# Patient Record
Sex: FEMALE | Race: WHITE | NOT HISPANIC OR LATINO | Employment: STUDENT | URBAN - METROPOLITAN AREA
[De-identification: names, ages, dates, MRNs, and addresses within clinical notes are randomized per-mention and may not be internally consistent; named-entity substitution may affect disease eponyms.]

---
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
## 2021-04-12 DIAGNOSIS — Z23 ENCOUNTER FOR IMMUNIZATION: ICD-10-CM

## 2021-05-19 ENCOUNTER — IMMUNIZATIONS (OUTPATIENT)
Dept: FAMILY MEDICINE CLINIC | Facility: HOSPITAL | Age: 21
End: 2021-05-19

## 2021-05-19 DIAGNOSIS — Z23 ENCOUNTER FOR IMMUNIZATION: Primary | ICD-10-CM

## 2021-05-19 PROCEDURE — 91300 SARS-COV-2 / COVID-19 MRNA VACCINE (PFIZER-BIONTECH) 30 MCG: CPT

## 2021-05-19 PROCEDURE — 0001A SARS-COV-2 / COVID-19 MRNA VACCINE (PFIZER-BIONTECH) 30 MCG: CPT

## 2021-06-09 ENCOUNTER — IMMUNIZATIONS (OUTPATIENT)
Dept: FAMILY MEDICINE CLINIC | Facility: HOSPITAL | Age: 21
End: 2021-06-09

## 2021-06-09 DIAGNOSIS — Z23 ENCOUNTER FOR IMMUNIZATION: Primary | ICD-10-CM

## 2021-06-09 PROCEDURE — 91300 SARS-COV-2 / COVID-19 MRNA VACCINE (PFIZER-BIONTECH) 30 MCG: CPT

## 2021-06-09 PROCEDURE — 0002A SARS-COV-2 / COVID-19 MRNA VACCINE (PFIZER-BIONTECH) 30 MCG: CPT

## 2022-06-30 NOTE — PROGRESS NOTES
Assessment/Plan:    Crohn's disease of small intestine with intestinal obstruction [facility])    40-year-old girl with terminal ileal Crohn's  She has had persistent symptoms even on steroids  She is not obstructed at this time  I have strongly recommended for the family to consider starting her on biologic or immunomodulator  We had a lengthy discussion with her father in regards to the various biologics and immunomodulators and what the side effects are  We discussed all including Remicade, Humira, Entyvio and Stelara as well as Imuran/6 MP  I would recommend starting her on a biologic at this time and have recommended either Remicade or Humira  We also discussed step-up vs step-down approach  At this time we will do only biologics  The patient's father would like to get an opinion from [facility] and I have encouraged this  Have an appointment next Monday  I did request him that he should make a decision soon since the patient is still having symptoms  He is in agreement  Another possibility would be to start her on methotrexate as well  I would also like to check stool studies  Repeat inflammatory markers  Check calprotectin  Diagnoses and all orders for this visit:    Crohn's disease of small intestine with intestinal obstruction (HCC)  -     Fecal leukocytes; Future  -     Ova and parasite examination; Future  -     Sedimentation rate, automated; Future  -     C-reactive protein; Future  -     Calprotectin,Fecal; Future  -     Clostridium difficile toxin by PCR; Future          Subjective:      Patient ID: Aleksander Baird is a 16 y o  female  HPI     40-year-old girl who presents to us for evaluation of Crohn's disease  She was admitted to the hospital in the past month with Crohn's flare  She has had disease for the past 2-3 years  This time imaging had shown significant inflammation in terminal ileum with possible obstruction  She does started on IV steroids  Lokesh Jeter is a 62 y.o.  female and presents with    Chief Complaint   Patient presents with    URI   Lokesh Jeter, who was evaluated through a synchronous (real-time) audio-video encounter, and/or her healthcare decision maker, is aware that it is a billable service, which includes applicable co-pays, with coverage as determined by her insurance carrier. She provided verbal consent to proceed and patient identification was verified. This visit was conducted pursuant to the emergency declaration under the [facility], [address] [place] [address] and the [employer] Act. A caregiver was present when appropriate. Ability to conduct physical exam was limited. The patient was located at: Home: [address] [place] [address]  The provider was located at: Facility (Appt Department): [address] [place] [address] 4  [address]  [phone]    --Pavel Soto MD on 6/30/2022 at 4:37 PM    Subjective:  Upper Respiratory Infection  Patient complains of symptoms of a URI. Symptoms include congestion, coryza, sore throat and cough. Onset of symptoms was 5 days ago, gradually improving since that time. She also c/o achiness for the past 5 days . She is drinking moderate amounts of fluids. . Evaluation to date: negative covid test Treatment to date: decongestants, mucinex. She has history of pneumonia with most recent episode last month. ROS     All other systems reviewed and are negative. Objective: There were no vitals filed for this visit. alert, well appearing, and in no distress, oriented to person, place, and time and normal appearing weight  Mental status - normal mood, behavior, speech, dress, motor activity, and thought processes  Chest - intermittent cough  Neurological - cranial nerves II through XII intact    LABS     TESTS      Assessment/Plan:    1.  Bronchitis  abx and cough She did well and was started to tolerate diet as well  She was discharged home and was on 40 mg of oral prednisone  She was tapered down to 10 mg but then had another flare at which point her steroids were increased to 20 mg  She then got admitted to the ER again with abdominal pain and the steroids will further increase to 30 mg  At this time she is having some abdominal discomfort  She is able to tolerate food  She does have regular bowel movements without any blood or mucus  She has no nausea vomiting  No fevers or chills  She is not losing weight  She does have swelling of her face due to the steroids  She also had a colonoscopy done in the past 2 weeks which showed normal colon and the terminal ileum was not possible to be intubated  No significant inflammation and biopsies showed findings suggestive of Crohn's disease  The following portions of the patient's history were reviewed and updated as appropriate: allergies, current medications, past family history, past medical history, past social history, past surgical history and problem list     Review of Systems   Constitutional: Negative  HENT: Negative  Eyes: Negative  Respiratory: Negative  Cardiovascular: Negative  Gastrointestinal:        See HPI   Endocrine: Negative  Genitourinary: Negative  Musculoskeletal: Negative  Skin: Negative  Allergic/Immunologic: Negative  Neurological: Negative  Hematological: Negative  Psychiatric/Behavioral: Negative  All other systems reviewed and are negative  Objective:      BP (!) 121/78 (BP Location: Left arm, Patient Position: Sitting, Cuff Size: Standard)   Pulse 84   Temp (!) 97 1 °F (36 2 °C) (Tympanic)   Ht 5' 3" (1 6 m)   Wt 67 kg (147 lb 12 8 oz)   LMP 03/21/2018   BMI 26 18 kg/m²          Physical Exam   Constitutional: She is oriented to person, place, and time  Vital signs are normal  She appears well-developed and well-nourished     HENT:   Head: suppressant ordered  - azithromycin (ZITHROMAX) 250 mg tablet; 2 today, then 1 daily till gone. Dispense: 6 Tablet; Refill: 0  - benzonatate (TESSALON) 200 mg capsule; Take 1 Capsule by mouth three (3) times daily as needed for Cough for up to 7 days. Dispense: 21 Capsule; Refill: 0      Lab review: no lab studies available for review at time of visit      I have discussed the diagnosis with the patient and the intended plan as seen in the above orders. I have discussed medication side effects and warnings with the patient as well. I have reviewed the plan of care with the patient, accepted their input and they are in agreement with the treatment goals. Normocephalic and atraumatic  Eyes: Conjunctivae are normal  Pupils are equal, round, and reactive to light  No scleral icterus  Neck: Normal range of motion  Cardiovascular: Normal rate, regular rhythm and normal heart sounds  Pulmonary/Chest: Effort normal and breath sounds normal  No respiratory distress  Abdominal: Soft  Normal appearance and bowel sounds are normal  She exhibits no distension, no ascites and no mass  There is no hepatosplenomegaly  There is tenderness (Diffusely but more so in the right lower quadrant)  No hernia  Musculoskeletal: Normal range of motion  Lymphadenopathy:     She has no cervical adenopathy  Neurological: She is alert and oriented to person, place, and time  Skin: Skin is warm  Psychiatric: She has a normal mood and affect   Her behavior is normal  Thought content normal

## 2022-08-30 ENCOUNTER — APPOINTMENT (OUTPATIENT)
Dept: LAB | Facility: CLINIC | Age: 22
End: 2022-08-30

## 2022-08-30 DIAGNOSIS — Z02.1 PRE-EMPLOYMENT EXAMINATION: ICD-10-CM

## 2022-08-30 PROCEDURE — 36415 COLL VENOUS BLD VENIPUNCTURE: CPT

## 2022-08-30 PROCEDURE — 86765 RUBEOLA ANTIBODY: CPT

## 2022-08-30 PROCEDURE — 86762 RUBELLA ANTIBODY: CPT

## 2022-08-30 PROCEDURE — 86735 MUMPS ANTIBODY: CPT

## 2022-08-30 PROCEDURE — 86480 TB TEST CELL IMMUN MEASURE: CPT

## 2022-08-30 PROCEDURE — 86787 VARICELLA-ZOSTER ANTIBODY: CPT

## 2022-08-31 LAB
MEV IGG SER QL IA: NORMAL
MUV IGG SER QL IA: NORMAL
RUBV IGG SERPL IA-ACNC: 99.6 IU/ML
VZV IGG SER QL IA: NORMAL

## 2022-09-01 LAB
GAMMA INTERFERON BACKGROUND BLD IA-ACNC: 0.04 IU/ML
M TB IFN-G BLD-IMP: NEGATIVE
M TB IFN-G CD4+ BCKGRND COR BLD-ACNC: -0.01 IU/ML
M TB IFN-G CD4+ BCKGRND COR BLD-ACNC: 0 IU/ML
MITOGEN IGNF BCKGRD COR BLD-ACNC: >10 IU/ML

## 2022-11-03 ENCOUNTER — APPOINTMENT (EMERGENCY)
Dept: RADIOLOGY | Facility: HOSPITAL | Age: 22
End: 2022-11-03

## 2022-11-03 ENCOUNTER — HOSPITAL ENCOUNTER (EMERGENCY)
Facility: HOSPITAL | Age: 22
Discharge: HOME | End: 2022-11-04
Attending: EMERGENCY MEDICINE

## 2022-11-03 VITALS
DIASTOLIC BLOOD PRESSURE: 62 MMHG | RESPIRATION RATE: 20 BRPM | OXYGEN SATURATION: 100 % | SYSTOLIC BLOOD PRESSURE: 113 MMHG | TEMPERATURE: 98.1 F | HEART RATE: 73 BPM

## 2022-11-03 DIAGNOSIS — M54.50 ACUTE LOW BACK PAIN: Primary | ICD-10-CM

## 2022-11-03 DIAGNOSIS — J40 BRONCHITIS: ICD-10-CM

## 2022-11-03 LAB
ANION GAP SERPL CALCULATED.3IONS-SCNC: 9 MMOL/L (ref 4–13)
BASOPHILS # BLD AUTO: 0.03 THOUSANDS/ÂΜL (ref 0–0.1)
BASOPHILS NFR BLD AUTO: 0 % (ref 0–1)
BILIRUB UR QL STRIP: NEGATIVE
BUN SERPL-MCNC: 10 MG/DL (ref 5–25)
CALCIUM SERPL-MCNC: 9.1 MG/DL (ref 8.4–10.2)
CHLORIDE SERPL-SCNC: 105 MMOL/L (ref 96–108)
CLARITY UR: CLEAR
CO2 SERPL-SCNC: 21 MMOL/L (ref 21–32)
COLOR UR: YELLOW
CREAT SERPL-MCNC: 0.51 MG/DL (ref 0.6–1.3)
EOSINOPHIL # BLD AUTO: 0.23 THOUSAND/ÂΜL (ref 0–0.61)
EOSINOPHIL NFR BLD AUTO: 2 % (ref 0–6)
ERYTHROCYTE [DISTWIDTH] IN BLOOD BY AUTOMATED COUNT: 12.2 % (ref 11.6–15.1)
EXT PREG TEST URINE: NEGATIVE
EXT. CONTROL ED NAV: NORMAL
FLUAV RNA RESP QL NAA+PROBE: NEGATIVE
FLUBV RNA RESP QL NAA+PROBE: NEGATIVE
GFR SERPL CREATININE-BSD FRML MDRD: 136 ML/MIN/1.73SQ M
GLUCOSE SERPL-MCNC: 102 MG/DL (ref 65–140)
GLUCOSE UR STRIP-MCNC: NEGATIVE MG/DL
HCT VFR BLD AUTO: 37.3 % (ref 34.8–46.1)
HGB BLD-MCNC: 12.8 G/DL (ref 11.5–15.4)
HGB UR QL STRIP.AUTO: NEGATIVE
IMM GRANULOCYTES # BLD AUTO: 0.02 THOUSAND/UL (ref 0–0.2)
IMM GRANULOCYTES NFR BLD AUTO: 0 % (ref 0–2)
KETONES UR STRIP-MCNC: NEGATIVE MG/DL
LEUKOCYTE ESTERASE UR QL STRIP: NEGATIVE
LYMPHOCYTES # BLD AUTO: 4.32 THOUSANDS/ÂΜL (ref 0.6–4.47)
LYMPHOCYTES NFR BLD AUTO: 34 % (ref 14–44)
MCH RBC QN AUTO: 27.1 PG (ref 26.8–34.3)
MCHC RBC AUTO-ENTMCNC: 34.3 G/DL (ref 31.4–37.4)
MCV RBC AUTO: 79 FL (ref 82–98)
MONOCYTES # BLD AUTO: 0.68 THOUSAND/ÂΜL (ref 0.17–1.22)
MONOCYTES NFR BLD AUTO: 5 % (ref 4–12)
NEUTROPHILS # BLD AUTO: 7.31 THOUSANDS/ÂΜL (ref 1.85–7.62)
NEUTS SEG NFR BLD AUTO: 59 % (ref 43–75)
NITRITE UR QL STRIP: NEGATIVE
NRBC BLD AUTO-RTO: 0 /100 WBCS
PH UR STRIP.AUTO: 6 [PH]
PLATELET # BLD AUTO: 277 THOUSANDS/UL (ref 149–390)
PMV BLD AUTO: 9.7 FL (ref 8.9–12.7)
POTASSIUM SERPL-SCNC: 3.6 MMOL/L (ref 3.5–5.3)
PROT UR STRIP-MCNC: NEGATIVE MG/DL
RBC # BLD AUTO: 4.73 MILLION/UL (ref 3.81–5.12)
RSV RNA RESP QL NAA+PROBE: NEGATIVE
SARS-COV-2 RNA RESP QL NAA+PROBE: NEGATIVE
SODIUM SERPL-SCNC: 135 MMOL/L (ref 135–147)
SP GR UR STRIP.AUTO: >=1.03 (ref 1–1.03)
UROBILINOGEN UR QL STRIP.AUTO: 1 E.U./DL
WBC # BLD AUTO: 12.59 THOUSAND/UL (ref 4.31–10.16)

## 2022-11-03 RX ORDER — KETOROLAC TROMETHAMINE 30 MG/ML
15 INJECTION, SOLUTION INTRAMUSCULAR; INTRAVENOUS ONCE
Status: COMPLETED | OUTPATIENT
Start: 2022-11-03 | End: 2022-11-03

## 2022-11-03 RX ORDER — CYCLOBENZAPRINE HCL 10 MG
10 TABLET ORAL 2 TIMES DAILY PRN
Qty: 20 TABLET | Refills: 0 | Status: SHIPPED | OUTPATIENT
Start: 2022-11-03

## 2022-11-03 RX ORDER — PREDNISONE 20 MG/1
40 TABLET ORAL ONCE
Status: COMPLETED | OUTPATIENT
Start: 2022-11-03 | End: 2022-11-03

## 2022-11-03 RX ORDER — NAPROXEN 500 MG/1
500 TABLET ORAL 2 TIMES DAILY WITH MEALS
Qty: 30 TABLET | Refills: 0 | Status: SHIPPED | OUTPATIENT
Start: 2022-11-03

## 2022-11-03 RX ORDER — LIDOCAINE 50 MG/G
1 PATCH TOPICAL ONCE
Status: DISCONTINUED | OUTPATIENT
Start: 2022-11-03 | End: 2022-11-04 | Stop reason: HOSPADM

## 2022-11-03 RX ORDER — PREDNISONE 20 MG/1
TABLET ORAL
Qty: 8 TABLET | Refills: 0 | Status: SHIPPED | OUTPATIENT
Start: 2022-11-03

## 2022-11-03 RX ORDER — ALBUTEROL SULFATE 90 UG/1
2 AEROSOL, METERED RESPIRATORY (INHALATION) ONCE
Status: COMPLETED | OUTPATIENT
Start: 2022-11-03 | End: 2022-11-03

## 2022-11-03 RX ORDER — INFLIXIMAB 100 MG/10ML
INJECTION, POWDER, LYOPHILIZED, FOR SOLUTION INTRAVENOUS
COMMUNITY

## 2022-11-03 RX ORDER — ONDANSETRON 2 MG/ML
4 INJECTION INTRAMUSCULAR; INTRAVENOUS ONCE
Status: DISCONTINUED | OUTPATIENT
Start: 2022-11-03 | End: 2022-11-03

## 2022-11-03 RX ADMIN — LIDOCAINE 5% 1 PATCH: 700 PATCH TOPICAL at 22:22

## 2022-11-03 RX ADMIN — PREDNISONE 40 MG: 20 TABLET ORAL at 22:20

## 2022-11-03 RX ADMIN — ALBUTEROL SULFATE 2 PUFF: 90 AEROSOL, METERED RESPIRATORY (INHALATION) at 22:23

## 2022-11-03 RX ADMIN — KETOROLAC TROMETHAMINE 15 MG: 30 INJECTION, SOLUTION INTRAMUSCULAR at 22:40

## 2022-11-04 ENCOUNTER — NURSE TRIAGE (OUTPATIENT)
Dept: PHYSICAL THERAPY | Facility: OTHER | Age: 22
End: 2022-11-04

## 2022-11-04 ENCOUNTER — TELEPHONE (OUTPATIENT)
Dept: PHYSICAL THERAPY | Facility: OTHER | Age: 22
End: 2022-11-04

## 2022-11-04 DIAGNOSIS — M54.42 ACUTE BILATERAL LOW BACK PAIN WITH BILATERAL SCIATICA: Primary | ICD-10-CM

## 2022-11-04 DIAGNOSIS — M54.41 ACUTE BILATERAL LOW BACK PAIN WITH BILATERAL SCIATICA: Primary | ICD-10-CM

## 2022-11-04 NOTE — ED PROVIDER NOTES
History  Chief Complaint   Patient presents with   • Back Pain     Back pain for the last 3 weeks, states she does contributes an injury to the back pain, just came on slowly and worse over time  State sshe has also had a cough for the same amount of time which has been productive at times  Took vicks severe flu pills and this tussin decongestive, taken for the past few days including this morning  79-year-old female for back pain and cough  Patient states she has had lower back pain for approximately 3 weeks  Patient denies inciting injury  Patient states pain travels down her legs  Patient denies any red flag symptoms  Patient also complains 2 weeks of cough  Patient denies any fever chills chest pain or shortness of breath  Patient states she just keeps coughing and cannot stop despite over-the-counter medications        History provided by:  Patient   used: No    Back Pain  Location:  Lumbar spine  Quality:  Cramping and shooting  Radiates to:  R posterior upper leg and L posterior upper leg  Pain severity:  Moderate  Pain is:  Same all the time  Onset quality:  Gradual  Timing:  Constant  Progression:  Worsening  Chronicity:  New  Context: not falling, not occupational injury and not recent injury    Ineffective treatments:  None tried  Associated symptoms: no abdominal pain, no bladder incontinence, no bowel incontinence, no chest pain, no fever, no headaches, no numbness, no paresthesias, no perianal numbness, no tingling and no weakness    Risk factors: no hx of cancer, no menopause and no recent surgery    Flu Symptoms  Presenting symptoms: cough    Presenting symptoms: no diarrhea, no fatigue, no fever, no headaches, no myalgias, no nausea, no shortness of breath and no sore throat    Severity:  Moderate  Onset quality:  Gradual  Progression:  Unchanged  Chronicity:  New  Ineffective treatments:  Decongestant and OTC medications  Associated symptoms: nasal congestion Associated symptoms: no chills, no decreased appetite, no ear pain and no neck stiffness    Risk factors: not elderly, no liver disease and not pregnant        Prior to Admission Medications   Prescriptions Last Dose Informant Patient Reported? Taking? inFLIXimab (REMICADE) 100 mg   Yes No   Sig: Inject into a catheter in a vein   levothyroxine (SYNTHROID) 150 mcg tablet  Self Yes No   Sig: Take 150 mcg by mouth daily        Facility-Administered Medications: None       Past Medical History:   Diagnosis Date   • Abdominal pain    • Abnormal CAT scan     inflammation on terminal ileum   • Hypothyroid        Past Surgical History:   Procedure Laterality Date   • GA COLONOSCOPY FLX DX W/COLLJ SPEC WHEN PFRMD N/A 3/29/2018    Procedure: COLONOSCOPY;  Surgeon: Juanito Robles MD;  Location: [facility]; Service: Gastroenterology       Family History   Problem Relation Age of Onset   • Crohn's disease Paternal Aunt      I have reviewed and agree with the history as documented  E-Cigarette/Vaping     E-Cigarette/Vaping Substances     Social History     Tobacco Use   • Smoking status: Never Smoker   • Smokeless tobacco: Never Used   Substance Use Topics   • Alcohol use: No   • Drug use: No       Review of Systems   Constitutional: Negative for chills, decreased appetite, fatigue and fever  HENT: Positive for congestion  Negative for ear pain and sore throat  Eyes: Negative for discharge and redness  Respiratory: Positive for cough  Negative for apnea, shortness of breath and wheezing  Cardiovascular: Negative for chest pain  Gastrointestinal: Negative for abdominal pain, bowel incontinence, diarrhea and nausea  Endocrine: Negative for cold intolerance and polydipsia  Genitourinary: Negative for bladder incontinence, difficulty urinating and hematuria  Musculoskeletal: Positive for back pain  Negative for arthralgias, myalgias and neck stiffness  Skin: Negative for color change and rash  Allergic/Immunologic: Negative for environmental allergies and immunocompromised state  Neurological: Negative for tingling, weakness, numbness, headaches and paresthesias  Hematological: Negative for adenopathy  Does not bruise/bleed easily  Psychiatric/Behavioral: Negative for agitation and behavioral problems  Physical Exam  Physical Exam  Vitals and nursing note reviewed  Constitutional:       Appearance: Normal appearance  She is well-developed  She is not toxic-appearing  HENT:      Head: Normocephalic and atraumatic  Right Ear: Tympanic membrane and external ear normal       Left Ear: Tympanic membrane and external ear normal       Nose: Nose normal  No nasal deformity or rhinorrhea  Mouth/Throat:      Dentition: Normal dentition  Pharynx: Uvula midline  Eyes:      General: Lids are normal          Right eye: No discharge  Left eye: No discharge  Conjunctiva/sclera: Conjunctivae normal       Pupils: Pupils are equal, round, and reactive to light  Neck:      Vascular: No carotid bruit or JVD  Trachea: Trachea normal    Cardiovascular:      Rate and Rhythm: Normal rate and regular rhythm  No extrasystoles are present  Chest Wall: PMI is not displaced  Pulses: Normal pulses  Pulmonary:      Effort: Pulmonary effort is normal  No accessory muscle usage or respiratory distress  Breath sounds: Normal breath sounds  No wheezing, rhonchi or rales  Abdominal:      General: Bowel sounds are normal       Palpations: Abdomen is soft  Abdomen is not rigid  There is no mass  Tenderness: There is no abdominal tenderness  There is no guarding or rebound  Musculoskeletal:      Right shoulder: No swelling, deformity or bony tenderness  Normal range of motion  Cervical back: Normal range of motion and neck supple  No deformity, tenderness or bony tenderness  Lumbar back: Spasms present  Decreased range of motion     Lymphadenopathy: Cervical: No cervical adenopathy  Skin:     General: Skin is warm and dry  Findings: No rash  Neurological:      Mental Status: She is alert and oriented to person, place, and time  GCS: GCS eye subscore is 4  GCS verbal subscore is 5  GCS motor subscore is 6  Cranial Nerves: No cranial nerve deficit  Sensory: No sensory deficit  Deep Tendon Reflexes: Reflexes are normal and symmetric     Psychiatric:         Speech: Speech normal          Behavior: Behavior normal          Vital Signs  ED Triage Vitals   Temperature Pulse Respirations Blood Pressure SpO2   11/03/22 2141 11/03/22 2141 11/03/22 2141 11/03/22 2141 11/03/22 2141   98 1 °F (36 7 °C) 73 20 113/62 100 %      Temp Source Heart Rate Source Patient Position - Orthostatic VS BP Location FiO2 (%)   11/03/22 2141 11/03/22 2141 11/03/22 2141 11/03/22 2141 --   Oral Monitor Lying Right arm       Pain Score       11/03/22 2240       4           Vitals:    11/03/22 2141   BP: 113/62   Pulse: 73   Patient Position - Orthostatic VS: Lying         Visual Acuity      ED Medications  Medications   lidocaine (LIDODERM) 5 % patch 1 patch (1 patch Topical Medication Applied 11/3/22 2222)   ketorolac (TORADOL) injection 15 mg (15 mg Intravenous Given 11/3/22 2240)   albuterol (PROVENTIL HFA,VENTOLIN HFA) inhaler 2 puff (2 puffs Inhalation Given 11/3/22 2223)   predniSONE tablet 40 mg (40 mg Oral Given 11/3/22 2220)       Diagnostic Studies  Results Reviewed     Procedure Component Value Units Date/Time    Basic metabolic panel [[id number]]  (Abnormal) Collected: 11/03/22 2244    Lab Status: Final result Specimen: Blood from Arm, Left Updated: 11/03/22 2309     Sodium 135 mmol/L      Potassium 3 6 mmol/L      Chloride 105 mmol/L      CO2 21 mmol/L      ANION GAP 9 mmol/L      BUN 10 mg/dL      Creatinine 0 51 mg/dL      Glucose 102 mg/dL      Calcium 9 1 mg/dL      eGFR 136 ml/min/1 73sq m     Narrative:      Jaimie guidelines for Chronic Kidney Disease (CKD):   •  Stage 1 with normal or high GFR (GFR > 90 mL/min/1 73 square meters)  •  Stage 2 Mild CKD (GFR = 60-89 mL/min/1 73 square meters)  •  Stage 3A Moderate CKD (GFR = 45-59 mL/min/1 73 square meters)  •  Stage 3B Moderate CKD (GFR = 30-44 mL/min/1 73 square meters)  •  Stage 4 Severe CKD (GFR = 15-29 mL/min/1 73 square meters)  •  Stage 5 End Stage CKD (GFR <15 mL/min/1 73 square meters)  Note: GFR calculation is accurate only with a steady state creatinine    UA w Reflex to Microscopic w Reflex to Culture [[id number]]  (Normal) Collected: 11/03/22 2243    Lab Status: Final result Specimen: Urine, Other Updated: 11/03/22 2251     Color, UA Yellow     Clarity, UA Clear     Specific Gravity, UA >=1 030     pH, UA 6 0     Leukocytes, UA Negative     Nitrite, UA Negative     Protein, UA Negative mg/dl      Glucose, UA Negative mg/dl      Ketones, UA Negative mg/dl      Urobilinogen, UA 1 0 E U /dl      Bilirubin, UA Negative     Occult Blood, UA Negative    CBC and differential [[id number]]  (Abnormal) Collected: 11/03/22 2244    Lab Status: Final result Specimen: Blood from Arm, Left Updated: 11/03/22 2250     WBC 12 59 Thousand/uL      RBC 4 73 Million/uL      Hemoglobin 12 8 g/dL      Hematocrit 37 3 %      MCV 79 fL      MCH 27 1 pg      MCHC 34 3 g/dL      RDW 12 2 %      MPV 9 7 fL      Platelets 528 Thousands/uL      nRBC 0 /100 WBCs      Neutrophils Relative 59 %      Immat GRANS % 0 %      Lymphocytes Relative 34 %      Monocytes Relative 5 %      Eosinophils Relative 2 %      Basophils Relative 0 %      Neutrophils Absolute 7 31 Thousands/µL      Immature Grans Absolute 0 02 Thousand/uL      Lymphocytes Absolute 4 32 Thousands/µL      Monocytes Absolute 0 68 Thousand/µL      Eosinophils Absolute 0 23 Thousand/µL      Basophils Absolute 0 03 Thousands/µL     FLU/RSV/COVID - if FLU/RSV clinically relevant [[id number]]  (Normal) Collected: 11/03/22 2202    Lab Status: Final result Specimen: Nares from Nose Updated: 11/03/22 2245     SARS-CoV-2 Negative     INFLUENZA A PCR Negative     INFLUENZA B PCR Negative     RSV PCR Negative    Narrative:      FOR PEDIATRIC PATIENTS - copy/paste COVID Guidelines URL to browser: [email or web address]    SARS-CoV-2 assay is a Nucleic Acid Amplification assay intended for the  qualitative detection of nucleic acid from SARS-CoV-2 in nasopharyngeal  swabs  Results are for the presumptive identification of SARS-CoV-2 RNA  Positive results are indicative of infection with SARS-CoV-2, the virus  causing COVID-19, but do not rule out bacterial infection or co-infection  with other viruses  Laboratories within the [place] and its  territories are required to report all positive results to the appropriate  public health authorities  Negative results do not preclude SARS-CoV-2  infection and should not be used as the sole basis for treatment or other  patient management decisions  Negative results must be combined with  clinical observations, patient history, and epidemiological information  This test has not been FDA cleared or approved  This test has been authorized by FDA under an Emergency Use Authorization  (EUA)  This test is only authorized for the duration of time the  declaration that circumstances exist justifying the authorization of the  emergency use of an in vitro diagnostic tests for detection of SARS-CoV-2  virus and/or diagnosis of COVID-19 infection under section 564(b)(1) of  the Act, 21 U  S C  197RIB-2(W)(7), unless the authorization is terminated  or revoked sooner  The test has been validated but independent review by FDA  and CLIA is pending  Test performed using [employer] GeneXpert: This RT-PCR assay targets N2,  a region unique to SARS-CoV-2  A conserved region in the E-gene was chosen  for pan-Sarbecovirus detection which includes SARS-CoV-2      According to CMS-2020-01-R, this platform meets the definition of high-throughput technology  POCT pregnancy, urine [[id number]]  (Normal) Resulted: 11/03/22 2155    Lab Status: Final result Updated: 11/03/22 2155     EXT PREG TEST UR (Ref: Negative) Negative     Control valid                 XR lumbar spine 2 or 3 views    (Results Pending)   XR chest 1 view    (Results Pending)              Procedures  Procedures         ED Course                                             MDM  Number of Diagnoses or Management Options  Acute low back pain: new and requires workup  Bronchitis: new and requires workup     Amount and/or Complexity of Data Reviewed  Clinical lab tests: reviewed and ordered  Tests in the radiology section of CPT®: ordered and reviewed  Tests in the medicine section of CPT®: reviewed and ordered  Independent visualization of images, tracings, or specimens: yes (No pneumonia or pneumothorax  No fracture dislocation of lumbar spine)    Patient Progress  Patient progress: stable      Disposition  Final diagnoses:   Acute low back pain   Bronchitis     Time reflects when diagnosis was documented in both MDM as applicable and the Disposition within this note     Time User Action Codes Description Comment    11/3/2022 11:41 PM Adelaide HAMM Add [M54 50] Acute low back pain     11/3/2022 11:41 PM Rockne Gitelman Add [J40] Bronchitis       ED Disposition     ED Disposition   Discharge    Condition   Stable    Date/Time   Thu Nov 3, 2022 11:40 PM    Comment   Alejandra Stewart discharge to home/self care                 Follow-up Information     Follow up With Specialties Details Why Contact Info    Khris Wilkerson MD Pediatrics Schedule an appointment as soon as possible for a visit   [address] [place] [address]   [address]  [facility] [address]  [phone]            Discharge Medication List as of 11/3/2022 11:50 PM      START taking these medications    Details   cyclobenzaprine (FLEXERIL) 10 mg tablet Take 1 tablet (10 mg total) by mouth 2 (two) times a day as needed for muscle spasms, Starting Thu 11/3/2022, Normal      naproxen (Naprosyn) 500 mg tablet Take 1 tablet (500 mg total) by mouth 2 (two) times a day with meals, Starting Thu 11/3/2022, Normal      predniSONE 20 mg tablet Take 2 tabs daily for 4 days, Print         CONTINUE these medications which have NOT CHANGED    Details   inFLIXimab (REMICADE) 100 mg Inject into a catheter in a vein, Historical Med      levothyroxine (SYNTHROID) 150 mcg tablet Take 150 mcg by mouth daily  , Starting Tue 1/16/2018, Historical Med                 PDMP Review     None          ED Provider  Electronically Signed by           Leslie Hernandez DO  11/04/22 0031

## 2022-11-04 NOTE — TELEPHONE ENCOUNTER
Nurse reached out to discuss recent referral entered for  [facility] program     Nurse spoke with father at preferred number listed in patients demographics  Patient is currently working and unavailable  Father asked if this was regarding her ED visit yesterday for her back  Nurse confirmed and informed him that his Cell number is listed as the ONLY and preferred contact in the patients demographics  Father will give message to his daughter and have her CB  Nurse confirmed  [facility] contact info, hours of operation, and VM instructions  Nurse informed him of protocol to defer the referral if the staff does not speak to the patient and offered to keep referral in pending status  Father agreed and asked nurse to please follow [facility] protocol for f/u call  Nurse agreed  Father very appreciative of call and discussion  Nurse also suggested patient add her phone number to demographics as she is an adult  Informed him chart may contain more than one number  Pts cell- [phone] per father

## 2022-11-04 NOTE — ED NOTES
Discharge reviewed with patient  Patient verbalized understanding and has no further questions at this time  Patient ambulatory off unit with steady gait        Didier Rodriguez, [address] [facility]  11/04/22 9832

## 2022-11-04 NOTE — TELEPHONE ENCOUNTER
Additional Information  • Negative: Is this related to a work injury? • Negative: Is this related to an MVA? • Negative: Are you currently recieving homecare services? Background - Initial Assessment  Clinical complaint: Pain is bilat low back, radiates down bilat legs to ankles  No numbness or tingling  Started 10/13/22  Was getting worse  NKI  Was seen in the ED 11/3/22  Date of onset: 10/13/22  Frequency of pain: intermittent  Quality of pain: cramping and shooting      Protocols used: [facility] COMPREHENSIVE SPINE PROGRAM PROTOCOL

## 2022-11-04 NOTE — TELEPHONE ENCOUNTER
Additional Information  • Negative: Has the patient had unexplained weight loss? • Negative: Does the patient have a fever? • Negative: Is the patient experiencing urine retention? • Negative: Is the patient experiencing acute drop foot or paralysis? • Negative: Has the patient experienced major trauma? (fall from height, high speed collision, direct blow to spine) and is also experiencing nausea, light-headedness, or loss of consciousness? • Negative: Is the patient experiencing blood in sputum? • Negative: Is this a chronic condition? Protocols used: [facility] COMPREHENSIVE SPINE PROGRAM PROTOCOL    This RN did review in detail the Comprehensive Spine Program and what we can provide for their back pain  Patient is agreeable to being triaged by this RN and would like to proceed with Physical Therapy  Referral was placed for Physical Therapy at the [facility] site  Patients information was sent to the  to make evaluation appointment  Patient made aware that the PT office  will be calling to schedule the appointment  Patient was provided with the phone number to the PT office  No further questions and/or concerns were voiced by the patient at this time  Patient states understanding of the referral that was placed  Referral Closed

## 2022-12-06 ENCOUNTER — EVALUATION (OUTPATIENT)
Dept: PHYSICAL THERAPY | Facility: REHABILITATION | Age: 22
End: 2022-12-06

## 2022-12-06 DIAGNOSIS — M54.41 ACUTE BILATERAL LOW BACK PAIN WITH BILATERAL SCIATICA: ICD-10-CM

## 2022-12-06 DIAGNOSIS — M54.42 ACUTE BILATERAL LOW BACK PAIN WITH BILATERAL SCIATICA: ICD-10-CM

## 2022-12-06 NOTE — PROGRESS NOTES
PT Evaluation     Today's date: 2022  Patient name: Emelia Del Rosario  : 2000  MRN: [id number]  Referring provider: Maurene Essex, PT  Dx:   Encounter Diagnosis     ICD-10-CM    1  Acute bilateral low back pain with bilateral sciatica  M54 42 Ambulatory referral to PT spine    M54 41                      Assessment  Assessment details: Emelia Del Rosario is a 25 y o  female present with:   Acute bilateral low back pain with bilateral sciatica    Jenn Ohara has the above listed impairments and will benefit from skilled Physical Therapist management to improve deficits to return to prior level of function  Teetee fitting into stability based progression  Impairments: abnormal muscle firing, abnormal or restricted ROM, activity intolerance, impaired physical strength, lacks appropriate home exercise program and pain with function  Understanding of Dx/Px/POC: good   Prognosis: good    Goals  Impairment Goals  - Decrease pain 0/10  - Increase strength to 5/5 throughout    Functional Goals  - Return to Prior Level of Function  - Increase Functional Status Measure to: 80  - Patient will be independent with HEP  -Patient will be able to return to activities of daily living without pain beyond 3/10    Plan  Patient would benefit from: skilled PT  Planned therapy interventions: joint mobilization, manual therapy, patient education, postural training, activity modification, abdominal trunk stabilization, body mechanics training, flexibility, functional ROM exercises, graded exercise, home exercise program, neuromuscular re-education, strengthening, stretching, therapeutic activities and therapeutic exercise  Frequency: 1x week  Duration in weeks: 5  Plan of Care beginning date: 2022  Plan of Care expiration date: 2023  Treatment plan discussed with: patient        Subjective Evaluation    History of Present Illness  Mechanism of injury: Teetee reports pain going on for 3-4 months   Notes pain with lying on her right side and slightly on her left  Notes pain with prolonged positioning, sitting, standing or lying down  Pain resolves pretty quickly after she gets moving  Denies cauda equina symptoms at this time or in past            Recurrent probem    Pain  Current pain ratin  At best pain ratin  At worst pain ratin  Location: Bilateral low back R>L  Quality: tight  Relieving factors: change in position  Aggravating factors: sitting and standing  Progression: no change    Social Support    Employment status: working ([employer], transporter)  Patient Goals  Patient goals for therapy: decreased pain, increased motion, increased strength, return to sport/leisure activities and independence with ADLs/IADLs  Patient goal: Driving w/o pain, "less pain"        Objective     Concurrent Complaints  Positive for night pain  Negative for disturbed sleep, bladder dysfunction, bowel dysfunction, saddle (S4) numbness and history of trauma    Active Range of Motion     Lumbar   Flexion:  WFL  Extension:  with pain Restriction level: minimal  Left lateral flexion:  WFL  Right lateral flexion:  WFL  Left rotation:  Restriction level: minimal  Right rotation:  Restriction level: minimal    Joint Play     Hypermobile: L2, L3, L4, L5 and S1     Pain: L2, L3, L4, L5 and S1     Strength/Myotome Testing     Lumbar   Left   Heel walk: normal  Toe walk: normal    Right   Heel walk: normal  Toe walk: normal    Left Hip   Planes of Motion   Flexion: WFL  Extension: 3+  Abduction: 3+    Right Hip   Planes of Motion   Flexion: WFL  Extension: 3+  Abduction: 3+    Left Knee   Flexion: WFL  Extension: WFL    Right Knee   Flexion: WFL  Extension: WFL    Left Ankle/Foot   Dorsiflexion: WFL  Plantar flexion: WFL  Right Ankle/Foot   Dorsiflexion: WFL  Plantar flexion: WFL  Tests     Lumbar   Positive prone instability        Left Pelvic Girdle/Sacrum   Negative: active SLR test      Right Pelvic Girdle/Sacrum   Negative: active SLR test                     Precautions: comp spine mod complexity     Daily Treatment Diary      Manual 12/06/22             STM ES nv                         Therapeutic Ex                    Fwd flx 10x5"                   LTR* 10x5"                         Neuro Re-ed                          ADIM* 5x5"                   ADIM marches* 3x8                   ADIM heel taps                    Bridges* 3x8                                       Low Rows nv                                Therapeutic Activity             STS nv            Leg Press                    Goblet squat                    Pallof rotation nv                                                                                                                                                                                                                                    Modalities                                                    *=on HEP

## 2022-12-15 ENCOUNTER — OFFICE VISIT (OUTPATIENT)
Dept: PHYSICAL THERAPY | Facility: REHABILITATION | Age: 22
End: 2022-12-15

## 2022-12-15 DIAGNOSIS — M54.42 ACUTE BILATERAL LOW BACK PAIN WITH BILATERAL SCIATICA: Primary | ICD-10-CM

## 2022-12-15 DIAGNOSIS — M54.41 ACUTE BILATERAL LOW BACK PAIN WITH BILATERAL SCIATICA: Primary | ICD-10-CM

## 2022-12-15 NOTE — PROGRESS NOTES
Daily Note     Today's date: 12/15/2022  Patient name: Rodolfo Arroyo  : 2000  MRN: [id number]  Referring provider: Araseli Patton, PT  Dx:   Encounter Diagnosis     ICD-10-CM    1  Acute bilateral low back pain with bilateral sciatica  M54 42     M54 41           Start Time: 1730  Stop Time: 181  Total time in clinic (min): 45 minutes    Subjective: Teetee reports that her back feels a little better since last session  Denies issues with HEP  Objective: See treatment diary below      Assessment: Tolerated treatment well  Patient demonstrated fatigue post treatment, exhibited good technique with therapeutic exercises and would benefit from continued PT  Cuing for correct form required, will benefit from continued progression and practice as tolerated  Plan: Continue per plan of care               Precautions: comp spine mod complexity     Daily Treatment Diary      Manual 22  12/15           Supine LAD nv 5'                        Therapeutic Ex                    Fwd flx seated 10x5" 10x5" dc                 LTR* 10x5" 10x5" begin/end           EOB self LSP distraction*  10"x2                        Neuro Re-ed                          ADIM* 5x5" 5x5"                  ADIM marches* 3x8 2x8                  ADIM heel taps  3x6                  Bridges* 3x8 3x10                  clamshells  L -pain                  Low Rows nv 3x12 GTB                               Therapeutic Activity             STS nv 3x10 8#           Leg Press                    Goblet squat                    Pallof rotation nv 2x10x5"                                                                                                                                                                                                                                   Modalities                                                    *=on HEP

## 2022-12-22 ENCOUNTER — OFFICE VISIT (OUTPATIENT)
Dept: PHYSICAL THERAPY | Facility: REHABILITATION | Age: 22
End: 2022-12-22

## 2022-12-22 DIAGNOSIS — M54.41 ACUTE BILATERAL LOW BACK PAIN WITH BILATERAL SCIATICA: Primary | ICD-10-CM

## 2022-12-22 DIAGNOSIS — M54.42 ACUTE BILATERAL LOW BACK PAIN WITH BILATERAL SCIATICA: Primary | ICD-10-CM

## 2022-12-22 NOTE — PROGRESS NOTES
Daily Note     Today's date: 2022  Patient name: Burak Valdez  : 2000  MRN: [id number]  Referring provider: Refugio Meeks, PT  Dx:   Encounter Diagnosis     ICD-10-CM    1  Acute bilateral low back pain with bilateral sciatica  M54 42     M54 41                      Subjective: Teetee reports that her back was sore for some time but feels pretty good today  Objective: See treatment diary below      Assessment: Tolerated treatment well  Patient demonstrated fatigue post treatment, exhibited good technique with therapeutic exercises and would benefit from continued PT  Pain with ppt in supine, adjusted to have Teetee only perform stabilization to prevent movement of low back which relieved pain  Plan: Continue per plan of care               Precautions: comp spine mod complexity     Daily Treatment Diary      Manual 12/06/22  12/15 12/22          Supine LAD nv 5' done                       Therapeutic Ex                    Fwd flx seated 10x5" 10x5" dc                 LTR* 10x5" 10x5" begin/end 10x5"          EOB self LSP distraction*  10"x2 np                       Neuro Re-ed                          ADIM* 5x5" 5x5"                  ADIM marches* 3x8 2x8 2x8                 ADIM heel taps  3x6 3x6                 Bridges* 3x8 3x10 3x12                 clamshells  L -pain                  Low Rows nv 3x12 GTB 3x12 GTB                              Therapeutic Activity             STS* nv 3x10 8# 3x10 8#          Leg Press                    Goblet squat                    Pallof rotation nv 2x10x5" 2x10x5" GTB                                                                                                                                                                                                                                  Modalities                                                    *=on HEP

## 2022-12-29 ENCOUNTER — OFFICE VISIT (OUTPATIENT)
Dept: PHYSICAL THERAPY | Facility: REHABILITATION | Age: 22
End: 2022-12-29

## 2022-12-29 DIAGNOSIS — M54.41 ACUTE BILATERAL LOW BACK PAIN WITH BILATERAL SCIATICA: Primary | ICD-10-CM

## 2022-12-29 DIAGNOSIS — M54.42 ACUTE BILATERAL LOW BACK PAIN WITH BILATERAL SCIATICA: Primary | ICD-10-CM

## 2022-12-29 NOTE — PROGRESS NOTES
Daily Note     Today's date: 2022  Patient name: Axel Parekh  : 2000  MRN: [id number]  Referring provider: Janis Louis, PT  Dx:   Encounter Diagnosis     ICD-10-CM    1  Acute bilateral low back pain with bilateral sciatica  M54 42     M54 41           Start Time:   Stop Time: 1850  Total time in clinic (min): 35 minutes    Subjective: Teetee reports that her back is feeling better overall  Objective: See treatment diary below      Assessment: Tolerated treatment well  Patient demonstrated fatigue post treatment, exhibited good technique with therapeutic exercises and would benefit from continued PT  Plan: Continue per plan of care               Precautions: comp spine mod complexity     Daily Treatment Diary      Manual 12/06/22  12/15 12/22 12/29         Supine LAD nv 5' done                       Therapeutic Ex                    Fwd flx seated 10x5" 10x5" dc  5x5"               Bike (endurance)    5' lvl 2         LTR* 10x5" 10x5" begin/end 10x5" 10x5         EOB self LSP distraction*  10"x2 np                       Neuro Re-ed                          ADIM* 5x5" 5x5"                  ADIM marches* 3x8 2x8 2x8                 ADIM heel taps  3x6 3x6  3x6               Bridges* 3x8 3x10 3x12  3x15               clamshells  L -pain                  Low Rows nv 3x12 GTB 3x12 GTB  3x10 BTB                            Therapeutic Activity             STS* nv 3x10 8# 3x10 8# 3x10 8#         Leg Press                    Goblet squat                    Pallof rotation nv 2x10x5" 2x10x5" GTB                 paloff holds    2x10x5" GTB                                                                                                                                                                                                            Modalities                                                    *=on HEP

## 2023-01-12 ENCOUNTER — APPOINTMENT (OUTPATIENT)
Dept: PHYSICAL THERAPY | Facility: REHABILITATION | Age: 23
End: 2023-01-12

## 2023-01-23 ENCOUNTER — EVALUATION (OUTPATIENT)
Dept: PHYSICAL THERAPY | Facility: REHABILITATION | Age: 23
End: 2023-01-23

## 2023-01-23 DIAGNOSIS — M54.41 ACUTE BILATERAL LOW BACK PAIN WITH BILATERAL SCIATICA: Primary | ICD-10-CM

## 2023-01-23 DIAGNOSIS — M54.42 ACUTE BILATERAL LOW BACK PAIN WITH BILATERAL SCIATICA: Primary | ICD-10-CM

## 2023-01-23 NOTE — LETTER
2023    Arian Barber MD  [address]   [address]  [id number] [address] [address]    Patient: Ute Lala  YOB: 2000   Date of Visit: 2023      Dear Dr Colten Fraser:    One of your patients, Ute Lala, was referred to me by the [facility] Comprehensive Spine program   Please review the attached evaluation summary from Ute Lala recent visit  Please verify that you agree with the plan of care by signing the attached document and sending it back to our office  If you have any questions or concerns, please don't hesitate to call  Sincerely,    Mary Bermudez, PT      Primary Care Provider:      I certify that I have read the below Plan of Care and certify the need for these services furnished under this plan of treatment while under my care  Arian Barber MD  [address]   [address]  [id number] [address] [address]  Via Fax: [phone]           PT Re-Evaluation  and PT Discharge    Today's date: 2023  Patient name: Ute Lala  : 2000  MRN: [id number]  Referring provider: Melody Vargas, PT  Dx:   Encounter Diagnosis     ICD-10-CM    1  Acute bilateral low back pain with bilateral sciatica  M54 42     M54 41           Start Time: 8347  Stop Time: 1835  Total time in clinic (min): 40 minutes    Assessment  Assessment details: Ute Lala has been compliant with attending PT and home exercise program since initial eval   Teetee  has made improvements in objective data since initial evalulation and has achieved all goals  Patient reports having returned to 70% of their prior level or function  Patient provided with updated Home Exercise Program, all questions answered, verbalized understanding and agreement to plan of care   Thus it was mutually decided to discontinue this episode of care and transition to Home Exercise Program    Impairments: activity intolerance and impaired physical strength    Symptom irritability: lowUnderstanding of Dx/Px/POC: good   Prognosis: good    Goals  Impairment Goals  - Decrease pain 0/10 - met  - Increase strength to 5/5 throughout - met    Functional Goals  - Return to Prior Level of Function - met  - Increase Functional Status Measure to: 80 - met  - Patient will be independent with HEP - met  -Patient will be able to return to activities of daily living without pain beyond 3/10 - progressed    Plan  Patient would benefit from: skilled PT  Planned therapy interventions: joint mobilization, manual therapy, patient education, postural training, activity modification, abdominal trunk stabilization, body mechanics training, flexibility, functional ROM exercises, graded exercise, home exercise program, neuromuscular re-education, strengthening, stretching, therapeutic activities and therapeutic exercise  Frequency: 1x week  Duration in weeks: 1  Plan of Care beginning date: 1/23/2023  Plan of Care expiration date: 1/23/2023  Treatment plan discussed with: patient        Subjective Evaluation    History of Present Illness  Mechanism of injury: Luz Maria Navarro has been seen for total of 5 visits for outpatient physical therapy  Patient rates overall improvement since beginning PT 70%  Patient's global rating of change is " Quite a bit better (5) " Patient reports improvements with "less intensity of pain, has been feeling better at work, Takes longer to hurt when I do get the pain in bed"  Patient reports most difficulty with lying in bed getting the pain and trying to find a comfortable position when in a prolonged position  Teetee reports pain going on for 3-4 months  Notes pain with lying on her right side and slightly on her left  Notes pain with prolonged positioning, sitting, standing or lying down  Pain resolves pretty quickly after she gets moving   Denies cauda equina symptoms at this time or in past            Recurrent probem    Pain  Current pain ratin  At best pain ratin  At worst pain ratin  Location: Bilateral low back R>L  Quality: tight  Relieving factors: change in position  Aggravating factors: sitting and standing  Progression: no change    Social Support    Employment status: working ([employer], transporter)  Patient Goals  Patient goals for therapy: decreased pain, increased motion, increased strength, return to sport/leisure activities and independence with ADLs/IADLs  Patient goal: Driving w/o pain, "less pain"        Objective     Concurrent Complaints  Negative for night pain, disturbed sleep, bladder dysfunction, bowel dysfunction, saddle (S4) numbness and history of trauma    Active Range of Motion     Lumbar   Flexion:  WFL  Extension:  with pain  Left lateral flexion:  WFL  Right lateral flexion:  WFL  Left rotation:  WFL  Right rotation:  WFL Restriction level: minimal    Joint Play     Hypermobile: L4 and L5     Pain: L4 and L5     Strength/Myotome Testing     Lumbar   Left   Heel walk: normal  Toe walk: normal    Right   Heel walk: normal  Toe walk: normal    Left Hip   Planes of Motion   Flexion: WFL  Extension: 4  Abduction: 4    Right Hip   Planes of Motion   Flexion: WFL  Extension: 4  Abduction: 4    Left Knee   Flexion: WFL  Extension: WFL    Right Knee   Flexion: WFL  Extension: WFL    Left Ankle/Foot   Dorsiflexion: WFL  Plantar flexion: WFL  Right Ankle/Foot   Dorsiflexion: WFL  Plantar flexion: WFL  Tests     Lumbar   Positive prone instability        Left Pelvic Girdle/Sacrum   Negative: active SLR test      Right Pelvic Girdle/Sacrum   Negative: active SLR test                   Precautions: comp spine mod complexity     Daily Treatment Diary      Manual 12/06/22  12/15 12/22 12/29 1/23        Supine LAD nv 5' done                       Therapeutic Ex                    Fwd flx seated 10x5" 10x5" dc  5x5"               Bike (endurance)    5' lvl 2 5' lvl 2        LTR* 10x5" 10x5" begin/end 10x5" 10x5" 10x5"        EOB self LSP distraction*  10"x2 np                       Neuro Re-ed                          ADIM* 5x5" 5x5"                  ADIM marches* 3x8 2x8 2x8                 ADIM heel taps  3x6 3x6  3x6  3x8             Bridges* 3x8 3x10 3x12  3x15  3x15             clamshells  L -pain                  Low Rows nv 3x12 GTB 3x12 GTB  3x10 BTB  3x12 BTB                          Therapeutic Activity             STS* nv 3x10 8# 3x10 8# 3x10 8# 3x10 8#        Leg Press                    Goblet squat                    Pallof rotation nv 2x10x5" 2x10x5" GTB                 paloff holds    2x10x5" GTB  2x10x5"  GTB                                                                                                                                                                                                          Modalities                                                    *=on HEP

## 2023-01-23 NOTE — PROGRESS NOTES
PT Re-Evaluation  and PT Discharge    Today's date: 2023  Patient name: Kevin Jaramillo  : 2000  MRN: [id number]  Referring provider: Gianni Thakkar PT  Dx:   Encounter Diagnosis     ICD-10-CM    1  Acute bilateral low back pain with bilateral sciatica  M54 42     M54 41           Start Time: 6945  Stop Time: 1835  Total time in clinic (min): 40 minutes    Assessment  Assessment details: Kevin Jaramillo has been compliant with attending PT and home exercise program since initial eval   Teetee  has made improvements in objective data since initial evalulation and has achieved all goals  Patient reports having returned to 70% of their prior level or function  Patient provided with updated Home Exercise Program, all questions answered, verbalized understanding and agreement to plan of care   Thus it was mutually decided to discontinue this episode of care and transition to Home Exercise Program    Impairments: activity intolerance and impaired physical strength    Symptom irritability: lowUnderstanding of Dx/Px/POC: good   Prognosis: good    Goals  Impairment Goals  - Decrease pain 0/10 - met  - Increase strength to 5/5 throughout - met    Functional Goals  - Return to Prior Level of Function - met  - Increase Functional Status Measure to: 80 - met  - Patient will be independent with HEP - met  -Patient will be able to return to activities of daily living without pain beyond 3/10 - progressed    Plan  Patient would benefit from: skilled PT  Planned therapy interventions: joint mobilization, manual therapy, patient education, postural training, activity modification, abdominal trunk stabilization, body mechanics training, flexibility, functional ROM exercises, graded exercise, home exercise program, neuromuscular re-education, strengthening, stretching, therapeutic activities and therapeutic exercise  Frequency: 1x week  Duration in weeks: 1  Plan of Care beginning date: 2023  Plan of Care expiration date: 2023  Treatment plan discussed with: patient        Subjective Evaluation    History of Present Illness  Mechanism of injury: Wood Muñoz has been seen for total of 5 visits for outpatient physical therapy  Patient rates overall improvement since beginning PT 70%  Patient's global rating of change is " Quite a bit better (5) " Patient reports improvements with "less intensity of pain, has been feeling better at work, Takes longer to hurt when I do get the pain in bed"  Patient reports most difficulty with lying in bed getting the pain and trying to find a comfortable position when in a prolonged position  Teetee reports pain going on for 3-4 months  Notes pain with lying on her right side and slightly on her left  Notes pain with prolonged positioning, sitting, standing or lying down  Pain resolves pretty quickly after she gets moving   Denies cauda equina symptoms at this time or in past            Recurrent probem    Pain  Current pain ratin  At best pain ratin  At worst pain ratin  Location: Bilateral low back R>L  Quality: tight  Relieving factors: change in position  Aggravating factors: sitting and standing  Progression: no change    Social Support    Employment status: working ([employer], [employer])  Patient Goals  Patient goals for therapy: decreased pain, increased motion, increased strength, return to sport/leisure activities and independence with ADLs/IADLs  Patient goal: Driving w/o pain, "less pain"        Objective     Concurrent Complaints  Negative for night pain, disturbed sleep, bladder dysfunction, bowel dysfunction, saddle (S4) numbness and history of trauma    Active Range of Motion     Lumbar   Flexion:  WFL  Extension:  with pain  Left lateral flexion:  WFL  Right lateral flexion:  WFL  Left rotation:  WFL  Right rotation:  WFL Restriction level: minimal    Joint Play     Hypermobile: L4 and L5     Pain: L4 and L5 Strength/Myotome Testing     Lumbar   Left   Heel walk: normal  Toe walk: normal    Right   Heel walk: normal  Toe walk: normal    Left Hip   Planes of Motion   Flexion: WFL  Extension: 4  Abduction: 4    Right Hip   Planes of Motion   Flexion: WFL  Extension: 4  Abduction: 4    Left Knee   Flexion: WFL  Extension: WFL    Right Knee   Flexion: WFL  Extension: WFL    Left Ankle/Foot   Dorsiflexion: WFL  Plantar flexion: WFL  Right Ankle/Foot   Dorsiflexion: WFL  Plantar flexion: WFL  Tests     Lumbar   Positive prone instability        Left Pelvic Girdle/Sacrum   Negative: active SLR test      Right Pelvic Girdle/Sacrum   Negative: active SLR test                    Precautions: comp spine mod complexity     Daily Treatment Diary      Manual 12/06/22  12/15 12/22 12/29 1/23        Supine LAD nv 5' done                       Therapeutic Ex                    Fwd flx seated 10x5" 10x5" dc  5x5"               Bike (endurance)    5' lvl 2 5' lvl 2        LTR* 10x5" 10x5" begin/end 10x5" 10x5" 10x5"        EOB self LSP distraction*  10"x2 np                       Neuro Re-ed                          ADIM* 5x5" 5x5"                  ADIM marches* 3x8 2x8 2x8                 ADIM heel taps  3x6 3x6  3x6  3x8             Bridges* 3x8 3x10 3x12  3x15  3x15             clamshells  L -pain                  Low Rows nv 3x12 GTB 3x12 GTB  3x10 BTB  3x12 BTB                          Therapeutic Activity             STS* nv 3x10 8# 3x10 8# 3x10 8# 3x10 8#        Leg Press                    Goblet squat                    Pallof rotation nv 2x10x5" 2x10x5" GTB                 paloff holds    2x10x5" GTB  2x10x5"  GTB                                                                                                                                                                                                          Modalities                                                    *=on HEP

## 2023-01-25 ENCOUNTER — TELEPHONE (OUTPATIENT)
Dept: OBGYN CLINIC | Facility: HOSPITAL | Age: 23
End: 2023-01-25

## 2023-01-25 NOTE — TELEPHONE ENCOUNTER
Patient called to schedule appt with Rheumatology  Offered first available  Patient declining to schedule at this time

## 2023-02-28 NOTE — PROGRESS NOTES
Portions of the record may have been created with voice recognition software  Occasional wrong word or "sound a like" substitutions may have occurred due to the inherent limitations of voice recognition software  Read the chart carefully and recognize, using context, where substitutions have occurred  Assessment  1  Chronic midline low back pain without sciatica        Plan  Orders Placed This Encounter   Procedures   • MRI lumbar spine without contrast     Standing Status:   Future     Standing Expiration Date:   3/3/2027     Scheduling Instructions: There is no preparation for this test  Please leave your jewelry and valuables at home, wedding rings are the exception  All patients will be required to change into a hospital gown and pants  Street clothes are not permitted in the MRI  Magnetic nail polish must be removed prior to arrival for your test  Please bring your insurance cards, a form of photo ID and a list of your medications with you  Arrive 15 minutes prior to your appointment time in order to register  Please bring any prior CT or MRI studies of this area that were not performed at a [facility]  To schedule this appointment, please contact Central Scheduling at [phone]  Prior to your appointment, please make sure you complete the MRI Screening Form when you e-Check in for your appointment  This will be available starting 7 days before your appointment in [address]  You may receive an e-mail with an activation code if you do not have a [employer] account  If you do not have access to a device, we will complete your screening at your appointment  Order Specific Question:   What is the patient's sedation requirement? Answer:   No Sedation     Order Specific Question:   Is the patient pregnant?      Answer:   No     Order Specific Question:   Release to patient through [facility]     Answer:   Immediate     Order Specific Question:   Is order priority selected as STAT? Answer:   No     Order Specific Question:   Reason for Exam (FREE TEXT)     Answer:   midline low back pain without radiating symptoms     New Medications Ordered This Visit   Medications   • meloxicam (MOBIC) 15 mg tablet     Sig: Take 1 tablet (15 mg total) by mouth daily     Dispense:  30 tablet     Refill:  [de-identified]      80-year-old female with history of Crohn's disease presenting with 6-month history of midline low back pain without significant radiation into the lower extremities  Physical exam unremarkable with no motor or sensory deficits  Lumbar x-ray from 11-3-2022 showing no osseous abnormality     -At this time we will evaluate further with a lumbar MRI to rule out any discogenic, nerve root etiology  Will prescribe the patient meloxicam 15 mg daily as needed to help with the pain episodes  Emphasized importance of continuing with daily exercises  My impressions and treatment recommendations were discussed in detail with the patient who verbalized understanding and had no further questions  Discharge instructions were provided  I personally saw and examined the patient and I agree with the above discussed plan of care  History of Present Illness    Burak Valdez is a 25 y o  female with with history of Crohn's disease referred to [facility] by Barrington Hobson MD      Patient presents with 6-month history of midline low back pain that comes and goes and is intermittent  Occasionally radiates in the posterior thighs but mostly localized in the midline low back  When the pain occurs it lasts for a few hours  Currently she is having mild pain 4 out of 10  Describes as pressure-like, cramping, sharp  No significant numbness or weakness of the lower extremities  Does not use any assist device for ambulation  No particular activity brings on the pain but when the pain does occur it gets worse with bending forward and sitting and standing    In terms of management she has had physical therapy with some improvement but continued episodes  She has tried over-the-counter Tylenol and NSAIDs including ibuprofen and naproxen with some relief  She has not tried any heat or lidocaine patches  In terms of imaging patient has a lumbar x-ray from November 3, 2022 which shows no acute fractures or osseous lesions, no degenerative changes no malalignment noted  Patient denies any bowel bladder incontinence or saddle anesthesia  Denies any recent fevers chills or weight changes  Lab Results   Component Value Date    CREATININE 0 42 (L) 03/01/2023     Lab Results   Component Value Date    ALT 14 03/01/2023    ALT 17 10/18/2014         Imaging    LUMBAR SPINE  11-   INDICATION:   back pain      COMPARISON:  CT abdomen pelvis 3/3/2018     VIEWS:  XR SPINE LUMBAR 2 OR 3 VIEWS INJURY        FINDINGS:     There are 5 non rib bearing lumbar vertebral bodies       There is no evidence of acute fracture or destructive osseous lesion      Alignment is unremarkable       No significant lumbar degenerative change noted      The pedicles appear intact      Soft tissues are unremarkable      IMPRESSION:     Normal examination  No MRI cervical spine results found in the past 2 years   No MRI lumbar spine results found in the past 2 years   No MRI thoracic spine results found in the past 2 years   No X-ray cervical spine results found in the past 2 years   XR lumbar spine 2 or 3 views    Result Date: 11/4/2022  Impression     Normal examination  Workstation performed: [id number]          No X-ray hip/pelvis results found in the past 2 years   No X-ray knee results found in the past 2 years         I have personally reviewed and/or updated the patient's past medical history, past surgical history, family history, social history, current medications, allergies, and vital signs today       Review of Systems   Musculoskeletal: Positive for back pain, gait problem and joint swelling  Both back legs to ankles       Patient Active Problem List   Diagnosis   • Generalized abdominal pain   • Crohn's disease of small intestine with intestinal obstruction (HCC)       Past Medical History:   Diagnosis Date   • Abdominal pain    • Abnormal CAT scan     inflammation on terminal ileum   • Hypothyroid        Past Surgical History:   Procedure Laterality Date   • OK COLONOSCOPY FLX DX W/COLLJ SPEC WHEN PFRMD N/A 3/29/2018    Procedure: COLONOSCOPY;  Surgeon: Debbie Woods MD;  Location: [facility]; Service: Gastroenterology       Family History   Problem Relation Age of Onset   • Crohn's disease Paternal Aunt        Social History     Occupational History   • Not on file   Tobacco Use   • Smoking status: Never   • Smokeless tobacco: Never   Vaping Use   • Vaping Use: Never used   Substance and Sexual Activity   • Alcohol use: No   • Drug use: No   • Sexual activity: Never       Current Outpatient Medications on File Prior to Visit   Medication Sig   • inFLIXimab (REMICADE) 100 mg Inject into a catheter in a vein   • levothyroxine 150 mcg tablet Take 150 mcg by mouth daily     • cyclobenzaprine (FLEXERIL) 10 mg tablet Take 1 tablet (10 mg total) by mouth 2 (two) times a day as needed for muscle spasms (Patient not taking: Reported on 12/6/2022)   • [DISCONTINUED] naproxen (Naprosyn) 500 mg tablet Take 1 tablet (500 mg total) by mouth 2 (two) times a day with meals (Patient not taking: Reported on 12/6/2022)   • [DISCONTINUED] predniSONE 20 mg tablet Take 2 tabs daily for 4 days (Patient not taking: Reported on 12/6/2022)     No current facility-administered medications on file prior to visit  No Known Allergies    Physical Exam    /89   Pulse 74   Ht 5' 3" (1 6 m) Comment: verbal  Wt 72 8 kg (160 lb 6 4 oz)   BMI 28 41 kg/m²     Constitutional: normal, well developed, well nourished, alert, in no distress and non-toxic and no overt pain behavior    Eyes: anicteric  HEENT: grossly intact  Neck: supple, symmetric, trachea midline and no masses   Pulmonary:even and unlabored  Cardiovascular:No edema or pitting edema present  Skin:Normal without rashes or lesions and well hydrated  Psychiatric:Mood and affect appropriate  Neurologic:Cranial Nerves II-XII grossly intact  Musculoskeletal:normal      Palpation:    Minor tenderness to palpation midline lumbar spine region  No tenderness with palpation of the left SI joint  No tenderness with palpation of the right SI joint    No tenderness with palpation of the left greater trochanter  No tenderness with palpation of the right greater trochanter    Sensory:    Intact to light touch grossly in the left lower extremity  Intact to light touch grossly in the right lower extremity    Muscle Strength      Hip flexion Knee flexion Knee extension  L4 Foot plantarflexion  S1 Foot   Dorsiflexion  L5 EHL-  Dorsiflexion  L5  EHL- Plantar Flexion-S1 Heel walking- L5 Toe walking   S1   L 5/5 5/5 5/5 5/5 5/5 5/5 5/5     R 5/5 5/5 5/5 5/5 5/5 5/5 5/5       DTRs: 2+ patellar, 2+ Achilles and symmetric       Special Tests:     Facet loading Straight leg raise LA FAIR   L  negative  negative  negative    R  negative  negative  negative

## 2023-03-01 ENCOUNTER — HOSPITAL ENCOUNTER (EMERGENCY)
Facility: HOSPITAL | Age: 23
Discharge: HOME | End: 2023-03-02
Attending: EMERGENCY MEDICINE

## 2023-03-01 DIAGNOSIS — R10.84 GENERALIZED ABDOMINAL PAIN: Primary | ICD-10-CM

## 2023-03-01 LAB
ALBUMIN SERPL BCP-MCNC: 3.9 G/DL (ref 3.5–5)
ALP SERPL-CCNC: 95 U/L (ref 34–104)
ALT SERPL W P-5'-P-CCNC: 14 U/L (ref 7–52)
ANION GAP SERPL CALCULATED.3IONS-SCNC: 9 MMOL/L (ref 4–13)
AST SERPL W P-5'-P-CCNC: 17 U/L (ref 13–39)
BASOPHILS # BLD AUTO: 0.03 THOUSANDS/ÂΜL (ref 0–0.1)
BASOPHILS NFR BLD AUTO: 0 % (ref 0–1)
BILIRUB SERPL-MCNC: 0.34 MG/DL (ref 0.2–1)
BILIRUB UR QL STRIP: NEGATIVE
BUN SERPL-MCNC: 10 MG/DL (ref 5–25)
CALCIUM SERPL-MCNC: 9.2 MG/DL (ref 8.4–10.2)
CHLORIDE SERPL-SCNC: 104 MMOL/L (ref 96–108)
CLARITY UR: ABNORMAL
CO2 SERPL-SCNC: 21 MMOL/L (ref 21–32)
COLOR UR: YELLOW
CREAT SERPL-MCNC: 0.42 MG/DL (ref 0.6–1.3)
CRP SERPL QL: 4 MG/L
EOSINOPHIL # BLD AUTO: 0.3 THOUSAND/ÂΜL (ref 0–0.61)
EOSINOPHIL NFR BLD AUTO: 2 % (ref 0–6)
ERYTHROCYTE [DISTWIDTH] IN BLOOD BY AUTOMATED COUNT: 12.5 % (ref 11.6–15.1)
EXT PREGNANCY TEST URINE: NEGATIVE
EXT. CONTROL: NORMAL
GFR SERPL CREATININE-BSD FRML MDRD: 145 ML/MIN/1.73SQ M
GLUCOSE SERPL-MCNC: 100 MG/DL (ref 65–140)
GLUCOSE UR STRIP-MCNC: NEGATIVE MG/DL
HCT VFR BLD AUTO: 37.2 % (ref 34.8–46.1)
HGB BLD-MCNC: 12.8 G/DL (ref 11.5–15.4)
HGB UR QL STRIP.AUTO: NEGATIVE
IMM GRANULOCYTES # BLD AUTO: 0.03 THOUSAND/UL (ref 0–0.2)
IMM GRANULOCYTES NFR BLD AUTO: 0 % (ref 0–2)
KETONES UR STRIP-MCNC: NEGATIVE MG/DL
LEUKOCYTE ESTERASE UR QL STRIP: NEGATIVE
LIPASE SERPL-CCNC: 26 U/L (ref 11–82)
LYMPHOCYTES # BLD AUTO: 4.31 THOUSANDS/ÂΜL (ref 0.6–4.47)
LYMPHOCYTES NFR BLD AUTO: 32 % (ref 14–44)
MCH RBC QN AUTO: 26.8 PG (ref 26.8–34.3)
MCHC RBC AUTO-ENTMCNC: 34.4 G/DL (ref 31.4–37.4)
MCV RBC AUTO: 78 FL (ref 82–98)
MONOCYTES # BLD AUTO: 0.91 THOUSAND/ÂΜL (ref 0.17–1.22)
MONOCYTES NFR BLD AUTO: 7 % (ref 4–12)
NEUTROPHILS # BLD AUTO: 7.83 THOUSANDS/ÂΜL (ref 1.85–7.62)
NEUTS SEG NFR BLD AUTO: 59 % (ref 43–75)
NITRITE UR QL STRIP: NEGATIVE
NRBC BLD AUTO-RTO: 0 /100 WBCS
PH UR STRIP.AUTO: 7.5 [PH]
PLATELET # BLD AUTO: 274 THOUSANDS/UL (ref 149–390)
PMV BLD AUTO: 9.5 FL (ref 8.9–12.7)
POTASSIUM SERPL-SCNC: 3.9 MMOL/L (ref 3.5–5.3)
PROT SERPL-MCNC: 7.1 G/DL (ref 6.4–8.4)
PROT UR STRIP-MCNC: NEGATIVE MG/DL
RBC # BLD AUTO: 4.77 MILLION/UL (ref 3.81–5.12)
SODIUM SERPL-SCNC: 134 MMOL/L (ref 135–147)
SP GR UR STRIP.AUTO: 1.02 (ref 1–1.03)
UROBILINOGEN UR QL STRIP.AUTO: 0.2 E.U./DL
WBC # BLD AUTO: 13.41 THOUSAND/UL (ref 4.31–10.16)

## 2023-03-02 ENCOUNTER — APPOINTMENT (EMERGENCY)
Dept: CT IMAGING | Facility: HOSPITAL | Age: 23
End: 2023-03-02

## 2023-03-02 VITALS
WEIGHT: 148 LBS | HEIGHT: 63 IN | DIASTOLIC BLOOD PRESSURE: 89 MMHG | TEMPERATURE: 98.8 F | RESPIRATION RATE: 16 BRPM | OXYGEN SATURATION: 100 % | BODY MASS INDEX: 26.22 KG/M2 | HEART RATE: 79 BPM | SYSTOLIC BLOOD PRESSURE: 127 MMHG

## 2023-03-02 RX ORDER — MAGNESIUM HYDROXIDE/ALUMINUM HYDROXICE/SIMETHICONE 120; 1200; 1200 MG/30ML; MG/30ML; MG/30ML
30 SUSPENSION ORAL ONCE
Status: COMPLETED | OUTPATIENT
Start: 2023-03-02 | End: 2023-03-02

## 2023-03-02 RX ADMIN — IOHEXOL 100 ML: 350 INJECTION, SOLUTION INTRAVENOUS at 00:16

## 2023-03-02 RX ADMIN — ALUMINUM HYDROXIDE, MAGNESIUM HYDROXIDE, AND DIMETHICONE 30 ML: 200; 20; 200 SUSPENSION ORAL at 01:35

## 2023-03-02 NOTE — ED PROVIDER NOTES
History  Chief Complaint   Patient presents with   • Abdominal Pain     Pt reports sudden abdominal pain starting today, 1 episode of vomiting earlier  Pt hx crohns and is on medication for it, no OTC meds PTA, denies diarrhea     77-year-old female with history of Crohn's disease on Remicade presents to the emergency department for evaluation of abdominal pain  The patient reports that approximately 4 hours prior to arrival she had acute onset generalized abdominal pain  She reports 1 episode of associated nonbloody and nonbilious vomiting approximately 1 hour prior to arrival   She states that she initially thought her symptoms were related to gas so took Gas-X with only minimal relief  She describes the pain as moderate sharp pain throughout her abdomen but primarily on the right side  Reports that her nausea has resolved  States that earlier today she was at her baseline health  She denies fevers, chills, diarrhea, urinary symptoms, sick contacts or recent travel  Reports that over the past couple of days she has felt constipated but did have a small bowel movement today  Prior to Admission Medications   Prescriptions Last Dose Informant Patient Reported? Taking? cyclobenzaprine (FLEXERIL) 10 mg tablet   No No   Sig: Take 1 tablet (10 mg total) by mouth 2 (two) times a day as needed for muscle spasms   Patient not taking: Reported on 12/6/2022   inFLIXimab (REMICADE) 100 mg   Yes No   Sig: Inject into a catheter in a vein   levothyroxine 150 mcg tablet   Yes No   Sig: Take 150 mcg by mouth daily        Facility-Administered Medications: None       Past Medical History:   Diagnosis Date   • Abdominal pain    • Abnormal CAT scan     inflammation on terminal ileum   • Hypothyroid        Past Surgical History:   Procedure Laterality Date   • TN COLONOSCOPY FLX DX W/COLLJ SPEC WHEN PFRMD N/A 3/29/2018    Procedure: COLONOSCOPY;  Surgeon: Karissa Arias MD;  Location: [facility];   Service: Gastroenterology       Family History   Problem Relation Age of Onset   • Crohn's disease Paternal Aunt      I have reviewed and agree with the history as documented  E-Cigarette/Vaping   • E-Cigarette Use Never User      E-Cigarette/Vaping Substances   • Nicotine No      Social History     Tobacco Use   • Smoking status: Never   • Smokeless tobacco: Never   Vaping Use   • Vaping Use: Never used   Substance Use Topics   • Alcohol use: No   • Drug use: No       Review of Systems   Constitutional: Negative for chills and fever  HENT: Negative for ear pain and sore throat  Eyes: Negative for pain and visual disturbance  Respiratory: Negative for cough and shortness of breath  Cardiovascular: Negative for chest pain and palpitations  Gastrointestinal: Positive for abdominal pain and vomiting  Genitourinary: Negative for dysuria and hematuria  Musculoskeletal: Negative for arthralgias and back pain  Skin: Negative for color change and rash  Neurological: Negative for seizures and syncope  All other systems reviewed and are negative  Physical Exam  Physical Exam  Vitals and nursing note reviewed  Constitutional:       General: She is not in acute distress  Appearance: She is well-developed  HENT:      Head: Normocephalic and atraumatic  Right Ear: External ear normal       Left Ear: External ear normal       Nose: Nose normal    Eyes:      Conjunctiva/sclera: Conjunctivae normal    Cardiovascular:      Rate and Rhythm: Normal rate and regular rhythm  Heart sounds: No murmur heard  Pulmonary:      Effort: Pulmonary effort is normal  No respiratory distress  Breath sounds: Normal breath sounds  Abdominal:      Palpations: Abdomen is soft  Tenderness: There is generalized abdominal tenderness  There is no guarding or rebound  Negative signs include Koo's sign and McBurney's sign  Musculoskeletal:         General: No swelling  Cervical back: Neck supple  Skin:     General: Skin is warm and dry  Capillary Refill: Capillary refill takes less than 2 seconds  Neurological:      Mental Status: She is alert     Psychiatric:         Mood and Affect: Mood normal          Vital Signs  ED Triage Vitals   Temperature Pulse Respirations Blood Pressure SpO2   03/01/23 2228 03/01/23 2225 03/01/23 2225 03/01/23 2226 03/01/23 2225   98 8 °F (37 1 °C) 70 14 119/55 100 %      Temp Source Heart Rate Source Patient Position - Orthostatic VS BP Location FiO2 (%)   03/01/23 2228 03/02/23 0137 03/02/23 0137 03/02/23 0137 --   Oral Monitor Sitting Right arm       Pain Score       --                  Vitals:    03/01/23 2225 03/01/23 2226 03/02/23 0137   BP:  119/55 127/89   Pulse: 70  79   Patient Position - Orthostatic VS:   Sitting         Visual Acuity      ED Medications  Medications   iohexol (OMNIPAQUE) 350 MG/ML injection (SINGLE-DOSE) 100 mL (100 mL Intravenous Given 3/2/23 0016)   aluminum-magnesium hydroxide-simethicone (MYLANTA) oral suspension 30 mL (30 mL Oral Given 3/2/23 0135)       Diagnostic Studies  Results Reviewed     Procedure Component Value Units Date/Time    Comprehensive metabolic panel [[id number]]  (Abnormal) Collected: 03/01/23 2255    Lab Status: Final result Specimen: Blood from Hand, Left Updated: 03/01/23 2317     Sodium 134 mmol/L      Potassium 3 9 mmol/L      Chloride 104 mmol/L      CO2 21 mmol/L      ANION GAP 9 mmol/L      BUN 10 mg/dL      Creatinine 0 42 mg/dL      Glucose 100 mg/dL      Calcium 9 2 mg/dL      AST 17 U/L      ALT 14 U/L      Alkaline Phosphatase 95 U/L      Total Protein 7 1 g/dL      Albumin 3 9 g/dL      Total Bilirubin 0 34 mg/dL      eGFR 145 ml/min/1 73sq m     Narrative:      [place] guidelines for Chronic Kidney Disease (CKD):   •  Stage 1 with normal or high GFR (GFR > 90 mL/min/1 73 square meters)  •  Stage 2 Mild CKD (GFR = 60-89 mL/min/1 73 square meters)  •  Stage 3A Moderate CKD (GFR = 45-59 mL/min/1 73 square meters)  •  Stage 3B Moderate CKD (GFR = 30-44 mL/min/1 73 square meters)  •  Stage 4 Severe CKD (GFR = 15-29 mL/min/1 73 square meters)  •  Stage 5 End Stage CKD (GFR <15 mL/min/1 73 square meters)  Note: GFR calculation is accurate only with a steady state creatinine    Lipase [[id number]]  (Normal) Collected: 03/01/23 2255    Lab Status: Final result Specimen: Blood from Hand, Left Updated: 03/01/23 2317     Lipase 26 u/L     C-reactive protein [[id number]]  (Abnormal) Collected: 03/01/23 2255    Lab Status: Final result Specimen: Blood from Hand, Left Updated: 03/01/23 2317     CRP 4 0 mg/L     UA w Reflex to Microscopic w Reflex to Culture [[id number]]  (Abnormal) Collected: 03/01/23 2249    Lab Status: Final result Specimen: Urine, Clean Catch Updated: 03/01/23 2304     Color, UA Yellow     Clarity, UA Slightly Cloudy     Specific Porter, UA 1 020     pH, UA 7 5     Leukocytes, UA Negative     Nitrite, UA Negative     Protein, UA Negative mg/dl      Glucose, UA Negative mg/dl      Ketones, UA Negative mg/dl      Urobilinogen, UA 0 2 E U /dl      Bilirubin, UA Negative     Occult Blood, UA Negative    CBC and differential [[id number]]  (Abnormal) Collected: 03/01/23 2255    Lab Status: Final result Specimen: Blood from Hand, Left Updated: 03/01/23 2301     WBC 13 41 Thousand/uL      RBC 4 77 Million/uL      Hemoglobin 12 8 g/dL      Hematocrit 37 2 %      MCV 78 fL      MCH 26 8 pg      MCHC 34 4 g/dL      RDW 12 5 %      MPV 9 5 fL      Platelets 710 Thousands/uL      nRBC 0 /100 WBCs      Neutrophils Relative 59 %      Immat GRANS % 0 %      Lymphocytes Relative 32 %      Monocytes Relative 7 %      Eosinophils Relative 2 %      Basophils Relative 0 %      Neutrophils Absolute 7 83 Thousands/µL      Immature Grans Absolute 0 03 Thousand/uL      Lymphocytes Absolute 4 31 Thousands/µL      Monocytes Absolute 0 91 Thousand/µL      Eosinophils Absolute 0 30 Thousand/µL      Basophils Absolute 0 03 Thousands/µL     POCT pregnancy, urine [[id number]]  (Normal) Resulted: 03/01/23 2300    Lab Status: Final result Updated: 03/01/23 2300     EXT Preg Test, Ur Negative     Control Valid                 CT abdomen pelvis with contrast   Final Result by Alba Caputo MD (03/02 0122)      Thickening and mucosal hyperenhancement of the terminal ileum most compatible with the patient's known history of Crohn's disease  No evidence of large or small bowel obstruction  No evidence of focal stricture  No free air or free fluid  Workstation performed: [id number]                    Procedures  Procedures         ED Course                 Medical Decision Making  57-year-old female presented to the emergency department for evaluation of abdominal pain  Arrival the patient was awake, alert, oriented and in no acute distress  Initial vital signs within normal limits  On exam patient noted to have generalized abdominal tenderness to palpation  Sickle exam was otherwise unremarkable  No peritoneal signs were present  Based on the patient's presenting symptoms and comorbidities a broad differential was considered  The patient was offered analgesic medications but she declined  Work-up done in the emergency department showed the patient had a mild leukocytosis and minimally elevated CRP  Imaging done in the emergency department was consistent with the patient's known Crohn's disease  No acute findings were present  On reevaluation the patient reported that her symptoms had significantly improved but was occasionally having a feeling of gas and bloating  Patient provided with a dose of Mylanta  All diagnostic studies were discussed with the patient in detail  Treatment options were discussed including a course of steroids  Patient stated that she preferred to follow-up with her gastroenterologist regarding the need for starting a course of steroids    The patient is appropriate for discharge at this time  Return precautions were discussed  Patient agrees with the plan for discharge and feels comfortable to go home with proper f/u  Advised to return for worsening or additional problems  Diagnostic tests were reviewed and questions answered  Diagnosis, care plan and treatment options were discussed  The patient understands instructions and will follow up as directed  Generalized abdominal pain: acute illness or injury  Amount and/or Complexity of Data Reviewed  Independent Historian: parent     Details: The patient's father accompanied the patient here in the emergency department  He provided additional history about the patient being diagnosed with Crohn's disease in 2018 and receiving her care in [place]  External Data Reviewed: labs, radiology and notes  Details: Prior labs, notes and imaging were reviewed  Labs: ordered  Radiology: ordered  Risk  OTC drugs  Prescription drug management  Disposition  Final diagnoses:   Generalized abdominal pain     Time reflects when diagnosis was documented in both MDM as applicable and the Disposition within this note     Time User Action Codes Description Comment    3/2/2023  1:49 AM Leslie Campo [R10 [id number]] Generalized abdominal pain       ED Disposition     ED Disposition   Discharge    Condition   Stable    Date/Time   Thu Mar 2, 2023  1:48 AM    Comment   Shaggy Stewart discharge to home/self care                 Follow-up Information     Follow up With Specialties Details Why Contact Info Additional Information    Daiana Salgado MD Pediatrics Schedule an appointment as soon as possible for a visit   [address] [facility]   [address]  [place] [address] [facility] Emergency Department Emergency Medicine Go to  If symptoms worsen [address] [facility],[address] [id number]  [address] Emergency Department, [address], [address] [place] [address]          Discharge Medication List as of 3/2/2023  1:49 AM      CONTINUE these medications which have NOT CHANGED    Details   cyclobenzaprine (FLEXERIL) 10 mg tablet Take 1 tablet (10 mg total) by mouth 2 (two) times a day as needed for muscle spasms, Starting Thu 11/3/2022, Normal      inFLIXimab (REMICADE) 100 mg Inject into a catheter in a vein, Historical Med      levothyroxine 150 mcg tablet Take 150 mcg by mouth daily  , Starting Tue 1/16/2018, Historical Med      naproxen (Naprosyn) 500 mg tablet Take 1 tablet (500 mg total) by mouth 2 (two) times a day with meals, Starting u 11/3/2022, Normal      predniSONE 20 mg tablet Take 2 tabs daily for 4 days, Print             No discharge procedures on file      PDMP Review     None          ED Provider  Electronically Signed by           Nereyda Ashraf MD  03/04/23 3142

## 2023-03-03 ENCOUNTER — CONSULT (OUTPATIENT)
Dept: PAIN MEDICINE | Facility: CLINIC | Age: 23
End: 2023-03-03

## 2023-03-03 VITALS
DIASTOLIC BLOOD PRESSURE: 89 MMHG | SYSTOLIC BLOOD PRESSURE: 118 MMHG | BODY MASS INDEX: 28.42 KG/M2 | HEART RATE: 74 BPM | WEIGHT: 160.4 LBS | HEIGHT: 63 IN

## 2023-03-03 DIAGNOSIS — M54.50 CHRONIC MIDLINE LOW BACK PAIN WITHOUT SCIATICA: Primary | ICD-10-CM

## 2023-03-03 DIAGNOSIS — G89.29 CHRONIC MIDLINE LOW BACK PAIN WITHOUT SCIATICA: Primary | ICD-10-CM

## 2023-03-03 RX ORDER — MELOXICAM 15 MG/1
15 TABLET ORAL DAILY
Qty: 30 TABLET | Refills: 0 | Status: SHIPPED | OUTPATIENT
Start: 2023-03-03

## 2023-03-14 ENCOUNTER — TELEPHONE (OUTPATIENT)
Dept: RADIOLOGY | Facility: CLINIC | Age: 23
End: 2023-03-14

## 2023-03-14 ENCOUNTER — HOSPITAL ENCOUNTER (OUTPATIENT)
Dept: MRI IMAGING | Facility: HOSPITAL | Age: 23
Discharge: HOME | End: 2023-03-14

## 2023-03-14 DIAGNOSIS — M54.50 CHRONIC MIDLINE LOW BACK PAIN WITHOUT SCIATICA: ICD-10-CM

## 2023-03-14 DIAGNOSIS — G89.29 CHRONIC MIDLINE LOW BACK PAIN WITHOUT SCIATICA: ICD-10-CM

## 2023-03-14 NOTE — TELEPHONE ENCOUNTER
----- Message from Randy Clark DO sent at 3/14/2023  3:35 PM EDT -----  Please inform pt that lumbar MRI does not show any nerve root compression or stenosis  Would continue with conservative management and follow up as scheduled   Thanks   ----- Message -----  From: Interface, Radiology Results In  Sent: 3/14/2023   3:24 PM EDT  To: Randy Clark DO

## 2023-04-19 ENCOUNTER — APPOINTMENT (OUTPATIENT)
Dept: LAB | Facility: CLINIC | Age: 23
End: 2023-04-19

## 2023-04-19 DIAGNOSIS — M54.50 CHRONIC BILATERAL LOW BACK PAIN WITHOUT SCIATICA: ICD-10-CM

## 2023-04-19 DIAGNOSIS — G89.29 CHRONIC BILATERAL LOW BACK PAIN WITHOUT SCIATICA: ICD-10-CM

## 2023-04-19 LAB
CRP SERPL QL: 5.9 MG/L
ERYTHROCYTE [SEDIMENTATION RATE] IN BLOOD: 33 MM/HOUR (ref 0–19)

## 2023-04-20 LAB — ANA SER QL IA: NEGATIVE

## 2023-05-30 ENCOUNTER — APPOINTMENT (OUTPATIENT)
Dept: LAB | Facility: CLINIC | Age: 23
End: 2023-05-30

## 2023-05-30 DIAGNOSIS — K50.919 CROHN'S DISEASE WITH COMPLICATION, UNSPECIFIED GASTROINTESTINAL TRACT LOCATION (HCC): ICD-10-CM

## 2023-05-30 DIAGNOSIS — E03.9 HYPOTHYROIDISM, UNSPECIFIED TYPE: ICD-10-CM

## 2023-05-31 LAB
HBV SURFACE AB SER-ACNC: 3.72 MIU/ML
HCV AB SER QL: NORMAL

## 2023-07-06 ENCOUNTER — APPOINTMENT (OUTPATIENT)
Dept: LAB | Facility: CLINIC | Age: 23
End: 2023-07-06
Payer: COMMERCIAL

## 2023-07-06 DIAGNOSIS — R76.12 REACTION TO QUANTIFERON-TB TEST: ICD-10-CM

## 2023-07-06 DIAGNOSIS — Z11.59 NEED FOR HEPATITIS B SCREENING TEST: ICD-10-CM

## 2023-07-06 LAB — HBV SURFACE AB SER-ACNC: 267 MIU/ML

## 2023-07-06 PROCEDURE — 86706 HEP B SURFACE ANTIBODY: CPT

## 2023-07-06 PROCEDURE — 86480 TB TEST CELL IMMUN MEASURE: CPT

## 2023-07-06 PROCEDURE — 36415 COLL VENOUS BLD VENIPUNCTURE: CPT

## 2023-07-09 LAB
GAMMA INTERFERON BACKGROUND BLD IA-ACNC: 0.01 IU/ML
M TB IFN-G BLD-IMP: NEGATIVE
M TB IFN-G CD4+ BCKGRND COR BLD-ACNC: 0 IU/ML
M TB IFN-G CD4+ BCKGRND COR BLD-ACNC: 0 IU/ML
MITOGEN IGNF BCKGRD COR BLD-ACNC: >10 IU/ML

## 2023-07-14 ENCOUNTER — HOSPITAL ENCOUNTER (EMERGENCY)
Facility: HOSPITAL | Age: 23
Discharge: HOME | End: 2023-07-14
Attending: EMERGENCY MEDICINE
Payer: COMMERCIAL

## 2023-07-14 ENCOUNTER — APPOINTMENT (EMERGENCY)
Dept: CT IMAGING | Facility: HOSPITAL | Age: 23
End: 2023-07-14
Payer: COMMERCIAL

## 2023-07-14 VITALS
SYSTOLIC BLOOD PRESSURE: 109 MMHG | TEMPERATURE: 98.3 F | HEART RATE: 58 BPM | OXYGEN SATURATION: 99 % | DIASTOLIC BLOOD PRESSURE: 60 MMHG | RESPIRATION RATE: 18 BRPM

## 2023-07-14 DIAGNOSIS — K56.600 PARTIAL SMALL BOWEL OBSTRUCTION (HCC): Primary | ICD-10-CM

## 2023-07-14 DIAGNOSIS — E87.6 HYPOKALEMIA: ICD-10-CM

## 2023-07-14 LAB
ALBUMIN SERPL BCP-MCNC: 3.9 G/DL (ref 3.5–5)
ALP SERPL-CCNC: 91 U/L (ref 34–104)
ALT SERPL W P-5'-P-CCNC: 14 U/L (ref 7–52)
ANION GAP SERPL CALCULATED.3IONS-SCNC: 9 MMOL/L
AST SERPL W P-5'-P-CCNC: 14 U/L (ref 13–39)
BASOPHILS # BLD AUTO: 0.03 THOUSANDS/ÂΜL (ref 0–0.1)
BASOPHILS NFR BLD AUTO: 0 % (ref 0–1)
BILIRUB DIRECT SERPL-MCNC: 0.14 MG/DL (ref 0–0.2)
BILIRUB SERPL-MCNC: 0.63 MG/DL (ref 0.2–1)
BILIRUB UR QL STRIP: NEGATIVE
BUN SERPL-MCNC: 11 MG/DL (ref 5–25)
CALCIUM SERPL-MCNC: 9 MG/DL (ref 8.4–10.2)
CHLORIDE SERPL-SCNC: 106 MMOL/L (ref 96–108)
CLARITY UR: CLEAR
CO2 SERPL-SCNC: 23 MMOL/L (ref 21–32)
COLOR UR: YELLOW
CREAT SERPL-MCNC: 0.45 MG/DL (ref 0.6–1.3)
EOSINOPHIL # BLD AUTO: 0.27 THOUSAND/ÂΜL (ref 0–0.61)
EOSINOPHIL NFR BLD AUTO: 2 % (ref 0–6)
ERYTHROCYTE [DISTWIDTH] IN BLOOD BY AUTOMATED COUNT: 12.4 % (ref 11.6–15.1)
GFR SERPL CREATININE-BSD FRML MDRD: 141 ML/MIN/1.73SQ M
GLUCOSE SERPL-MCNC: 94 MG/DL (ref 65–140)
GLUCOSE UR STRIP-MCNC: NEGATIVE MG/DL
HCG SERPL QL: NEGATIVE
HCT VFR BLD AUTO: 37.8 % (ref 34.8–46.1)
HGB BLD-MCNC: 12.9 G/DL (ref 11.5–15.4)
HGB UR QL STRIP.AUTO: NEGATIVE
IMM GRANULOCYTES # BLD AUTO: 0.05 THOUSAND/UL (ref 0–0.2)
IMM GRANULOCYTES NFR BLD AUTO: 0 % (ref 0–2)
KETONES UR STRIP-MCNC: ABNORMAL MG/DL
LEUKOCYTE ESTERASE UR QL STRIP: NEGATIVE
LIPASE SERPL-CCNC: 18 U/L (ref 11–82)
LYMPHOCYTES # BLD AUTO: 2.81 THOUSANDS/ÂΜL (ref 0.6–4.47)
LYMPHOCYTES NFR BLD AUTO: 20 % (ref 14–44)
MCH RBC QN AUTO: 26.7 PG (ref 26.8–34.3)
MCHC RBC AUTO-ENTMCNC: 34.1 G/DL (ref 31.4–37.4)
MCV RBC AUTO: 78 FL (ref 82–98)
MONOCYTES # BLD AUTO: 0.8 THOUSAND/ÂΜL (ref 0.17–1.22)
MONOCYTES NFR BLD AUTO: 6 % (ref 4–12)
NEUTROPHILS # BLD AUTO: 10.13 THOUSANDS/ÂΜL (ref 1.85–7.62)
NEUTS SEG NFR BLD AUTO: 72 % (ref 43–75)
NITRITE UR QL STRIP: NEGATIVE
NRBC BLD AUTO-RTO: 0 /100 WBCS
PH UR STRIP.AUTO: 7 [PH]
PLATELET # BLD AUTO: 257 THOUSANDS/UL (ref 149–390)
PMV BLD AUTO: 9.3 FL (ref 8.9–12.7)
POTASSIUM SERPL-SCNC: 3.4 MMOL/L (ref 3.5–5.3)
PROT SERPL-MCNC: 7.3 G/DL (ref 6.4–8.4)
PROT UR STRIP-MCNC: NEGATIVE MG/DL
RBC # BLD AUTO: 4.84 MILLION/UL (ref 3.81–5.12)
SODIUM SERPL-SCNC: 138 MMOL/L (ref 135–147)
SP GR UR STRIP.AUTO: <=1.005 (ref 1–1.03)
UROBILINOGEN UR QL STRIP.AUTO: 0.2 E.U./DL
WBC # BLD AUTO: 14.09 THOUSAND/UL (ref 4.31–10.16)

## 2023-07-14 PROCEDURE — 85025 COMPLETE CBC W/AUTO DIFF WBC: CPT | Performed by: EMERGENCY MEDICINE

## 2023-07-14 PROCEDURE — 81003 URINALYSIS AUTO W/O SCOPE: CPT | Performed by: EMERGENCY MEDICINE

## 2023-07-14 PROCEDURE — 36415 COLL VENOUS BLD VENIPUNCTURE: CPT | Performed by: EMERGENCY MEDICINE

## 2023-07-14 PROCEDURE — 83690 ASSAY OF LIPASE: CPT | Performed by: EMERGENCY MEDICINE

## 2023-07-14 PROCEDURE — 80048 BASIC METABOLIC PNL TOTAL CA: CPT | Performed by: EMERGENCY MEDICINE

## 2023-07-14 PROCEDURE — 84703 CHORIONIC GONADOTROPIN ASSAY: CPT | Performed by: EMERGENCY MEDICINE

## 2023-07-14 PROCEDURE — 74177 CT ABD & PELVIS W/CONTRAST: CPT

## 2023-07-14 PROCEDURE — 80076 HEPATIC FUNCTION PANEL: CPT | Performed by: EMERGENCY MEDICINE

## 2023-07-14 RX ORDER — METOCLOPRAMIDE HYDROCHLORIDE 5 MG/ML
10 INJECTION INTRAMUSCULAR; INTRAVENOUS ONCE
Status: COMPLETED | OUTPATIENT
Start: 2023-07-14 | End: 2023-07-14

## 2023-07-14 RX ORDER — ONDANSETRON 4 MG/1
4 TABLET, ORALLY DISINTEGRATING ORAL EVERY 6 HOURS PRN
Qty: 20 TABLET | Refills: 0 | Status: SHIPPED | OUTPATIENT
Start: 2023-07-14

## 2023-07-14 RX ORDER — ONDANSETRON 2 MG/ML
4 INJECTION INTRAMUSCULAR; INTRAVENOUS ONCE
Status: COMPLETED | OUTPATIENT
Start: 2023-07-14 | End: 2023-07-14

## 2023-07-14 RX ORDER — KETOROLAC TROMETHAMINE 30 MG/ML
15 INJECTION, SOLUTION INTRAMUSCULAR; INTRAVENOUS ONCE
Status: COMPLETED | OUTPATIENT
Start: 2023-07-14 | End: 2023-07-14

## 2023-07-14 RX ADMIN — KETOROLAC TROMETHAMINE 15 MG: 30 INJECTION, SOLUTION INTRAMUSCULAR at 02:00

## 2023-07-14 RX ADMIN — METOCLOPRAMIDE 10 MG: 5 INJECTION, SOLUTION INTRAMUSCULAR; INTRAVENOUS at 02:15

## 2023-07-14 RX ADMIN — ONDANSETRON 4 MG: 2 INJECTION INTRAMUSCULAR; INTRAVENOUS at 02:15

## 2023-07-14 RX ADMIN — IOHEXOL 100 ML: 350 INJECTION, SOLUTION INTRAVENOUS at 03:11

## 2023-07-14 NOTE — DISCHARGE INSTRUCTIONS
Use Zofran every 6 hours as needed for nausea and vomiting. You are choosing to leave instead of being admitted to the hospital as recommended. Come back at any point for reevaluation or possible admission. Follow-up with your gastroenterologist as soon as possible.

## 2023-07-14 NOTE — ED PROVIDER NOTES
History  Chief Complaint   Patient presents with   • Abdominal Pain     Pt presents to ED c/o abdominal pain since 7p. Started vomiting 1 hr ago. 19-year-old female previous history of Crohn's disease presenting with abdominal pain, nausea, vomiting. Abdominal pain is diffuse, worse in the lower abdomen centrally but also extends laterally from this area bilaterally. Patient notes abdominal pain a few hours prior to arrival.  Then vomiting. She notes that she has been eating buffalo sauce today and noted some orange tinted vomit. She denies lightheadedness, shortness of breath. She also notes associated lower back pain. Located in the central lower lumbar region. No urinary retention. Is passing gas. Reports a very small bowel movement today. Vomiting  Progression:  Worsening  Chronicity:  Recurrent  Relieved by:  Nothing  Worsened by:  Nothing  Ineffective treatments:  None tried  Associated symptoms: abdominal pain    Abdominal Pain  Associated symptoms: vomiting        Prior to Admission Medications   Prescriptions Last Dose Informant Patient Reported? Taking?    cyclobenzaprine (FLEXERIL) 10 mg tablet  Self No No   Sig: Take 1 tablet (10 mg total) by mouth 2 (two) times a day as needed for muscle spasms   Patient not taking: Reported on 12/6/2022   inFLIXimab (REMICADE) 100 mg Past Week Self Yes Yes   Sig: Inject into a catheter in a vein   levothyroxine 150 mcg tablet 7/14/2023 Self Yes Yes   Sig: Take 150 mcg by mouth daily     meloxicam (MOBIC) 15 mg tablet  Self No No   Sig: Take 1 tablet (15 mg total) by mouth daily      Facility-Administered Medications: None       Past Medical History:   Diagnosis Date   • Abdominal pain    • Abnormal CAT scan     inflammation on terminal ileum   • Crohn's disease ([address])    • Hypothyroid        Past Surgical History:   Procedure Laterality Date   • CA COLONOSCOPY FLX DX W/COLLJ SPEC WHEN PFRMD N/A 3/29/2018    Procedure: COLONOSCOPY;  Surgeon: Bryson Mao MD;  Location:  GI LAB; Service: Gastroenterology       Family History   Problem Relation Age of Onset   • Crohn's disease Paternal Aunt      I have reviewed and agree with the history as documented. E-Cigarette/Vaping   • E-Cigarette Use Never User      E-Cigarette/Vaping Substances   • Nicotine No      Social History     Tobacco Use   • Smoking status: Never   • Smokeless tobacco: Never   Vaping Use   • Vaping Use: Never used   Substance Use Topics   • Alcohol use: No   • Drug use: No       Review of Systems   Gastrointestinal: Positive for abdominal pain and vomiting. All other systems reviewed and are negative. Physical Exam  Physical Exam  Vitals and nursing note reviewed. Constitutional:       General: She is not in acute distress. Appearance: Normal appearance. She is not ill-appearing. HENT:      Head: Normocephalic and atraumatic. Right Ear: External ear normal.      Left Ear: External ear normal.      Nose: Nose normal.      Mouth/Throat:      Mouth: Mucous membranes are moist.   Eyes:      General:         Right eye: No discharge. Left eye: No discharge. Conjunctiva/sclera: Conjunctivae normal.   Cardiovascular:      Rate and Rhythm: Normal rate and regular rhythm. Pulses: Normal pulses. Heart sounds: No murmur heard. Pulmonary:      Effort: Pulmonary effort is normal.      Breath sounds: Normal breath sounds. Abdominal:      General: Abdomen is flat. There is no distension. Tenderness: There is generalized abdominal tenderness. There is no guarding or rebound. Musculoskeletal:         General: Normal range of motion. Cervical back: Normal range of motion. Skin:     General: Skin is warm. Capillary Refill: Capillary refill takes less than 2 seconds. Findings: No rash. Neurological:      General: No focal deficit present. Mental Status: She is alert. Mental status is at baseline.    Psychiatric:         Mood and Affect: Mood normal.         Behavior: Behavior normal.         Vital Signs  ED Triage Vitals   Temperature Pulse Respirations Blood Pressure SpO2   07/14/23 0108 07/14/23 0107 07/14/23 0107 07/14/23 0107 07/14/23 0107   98.3 °F (36.8 °C) 98 18 152/82 95 %      Temp Source Heart Rate Source Patient Position - Orthostatic VS BP Location FiO2 (%)   07/14/23 0108 07/14/23 0107 07/14/23 0107 07/14/23 0107 --   Oral Monitor Sitting Right arm       Pain Score       07/14/23 0200       8           Vitals:    07/14/23 0107 07/14/23 0440   BP: 152/82 109/60   Pulse: 98 58   Patient Position - Orthostatic VS: Sitting Lying         Visual Acuity      ED Medications  Medications   ondansetron (ZOFRAN) injection 4 mg (4 mg Intravenous Given 7/14/23 0215)   metoclopramide (REGLAN) injection 10 mg (10 mg Intravenous Given 7/14/23 0215)   ketorolac (TORADOL) injection 15 mg (15 mg Intravenous Given 7/14/23 0200)   iohexol (OMNIPAQUE) 350 MG/ML injection (SINGLE-DOSE) 100 mL (100 mL Intravenous Given 7/14/23 0311)       Diagnostic Studies  Results Reviewed     Procedure Component Value Units Date/Time    UA w Reflex to Microscopic w Reflex to Culture [[id number]]  (Abnormal) Collected: 07/14/23 0338    Lab Status: Final result Specimen: Urine, Clean Catch Updated: 07/14/23 0406     Color, UA Yellow     Clarity, UA Clear     Specific Gravity, UA <=1.005     pH, UA 7.0     Leukocytes, UA Negative     Nitrite, UA Negative     Protein, UA Negative mg/dl      Glucose, UA Negative mg/dl      Ketones, UA Trace mg/dl      Urobilinogen, UA 0.2 E.U./dl      Bilirubin, UA Negative     Occult Blood, UA Negative    hCG, qualitative pregnancy [[id number]]  (Normal) Collected: 07/14/23 0203    Lab Status: Final result Specimen: Blood from Hand, Left Updated: 07/14/23 0254     Preg, Serum Negative    Basic metabolic panel [[id number]]  (Abnormal) Collected: 07/14/23 0203    Lab Status: Final result Specimen: Blood from Hand, Left Updated: 07/14/23 4301     Sodium 138 mmol/L      Potassium 3.4 mmol/L      Chloride 106 mmol/L      CO2 23 mmol/L      ANION GAP 9 mmol/L      BUN 11 mg/dL      Creatinine 0.45 mg/dL      Glucose 94 mg/dL      Calcium 9.0 mg/dL      eGFR 141 ml/min/1.73sq m     Narrative:      [facility] guidelines for Chronic Kidney Disease (CKD):   •  Stage 1 with normal or high GFR (GFR > 90 mL/min/1.73 square meters)  •  Stage 2 Mild CKD (GFR = 60-89 mL/min/1.73 square meters)  •  Stage 3A Moderate CKD (GFR = 45-59 mL/min/1.73 square meters)  •  Stage 3B Moderate CKD (GFR = 30-44 mL/min/1.73 square meters)  •  Stage 4 Severe CKD (GFR = 15-29 mL/min/1.73 square meters)  •  Stage 5 End Stage CKD (GFR <15 mL/min/1.73 square meters)  Note: GFR calculation is accurate only with a steady state creatinine    Hepatic function panel [[id number]]  (Normal) Collected: 07/14/23 0203    Lab Status: Final result Specimen: Blood from Hand, Left Updated: 07/14/23 0224     Total Bilirubin 0.63 mg/dL      Bilirubin, Direct 0.14 mg/dL      Alkaline Phosphatase 91 U/L      AST 14 U/L      ALT 14 U/L      Total Protein 7.3 g/dL      Albumin 3.9 g/dL     Lipase [[id number]]  (Normal) Collected: 07/14/23 0203    Lab Status: Final result Specimen: Blood from Hand, Left Updated: 07/14/23 0224     Lipase 18 u/L     CBC and differential [[id number]]  (Abnormal) Collected: 07/14/23 0203    Lab Status: Final result Specimen: Blood from Hand, Left Updated: 07/14/23 0209     WBC 14.09 Thousand/uL      RBC 4.84 Million/uL      Hemoglobin 12.9 g/dL      Hematocrit 37.8 %      MCV 78 fL      MCH 26.7 pg      MCHC 34.1 g/dL      RDW 12.4 %      MPV 9.3 fL      Platelets 676 Thousands/uL      nRBC 0 /100 WBCs      Neutrophils Relative 72 %      Immat GRANS % 0 %      Lymphocytes Relative 20 %      Monocytes Relative 6 %      Eosinophils Relative 2 %      Basophils Relative 0 %      Neutrophils Absolute 10.13 Thousands/µL      Immature Grans Absolute 0.05 Thousand/uL      Lymphocytes Absolute 2.81 Thousands/µL      Monocytes Absolute 0.80 Thousand/µL      Eosinophils Absolute 0.27 Thousand/µL      Basophils Absolute 0.03 Thousands/µL                  CT recon only lumbar spine   Final Result by Lien Keller MD (07/14 1886)      No fracture or traumatic subluxation. Workstation performed: [id number]         CT abdomen pelvis with contrast   Final Result by Lien Keller MD (07/14 8218)      Thickening and mucosal hyperenhancement of the terminal ileum consistent with the patient's history of Crohn's disease. There is a prominent small bowel loop with fecalized contents proximally for which partial or developing small bowel obstruction    cannot be excluded. The study was marked in [facility] for immediate notification. Workstation performed: [id number]                    Procedures  Procedures         ED Course                               SBIRT 22yo+    Flowsheet Row Most Recent Value   Initial Alcohol Screen: US AUDIT-C     1. How often do you have a drink containing alcohol? 0 Filed at: 07/14/2023 0112   2. How many drinks containing alcohol do you have on a typical day you are drinking? 0 Filed at: 07/14/2023 0112   3a. Male UNDER 65: How often do you have five or more drinks on one occasion? 0 Filed at: 07/14/2023 0112   3b. FEMALE Any Age, or MALE 65+: How often do you have 4 or more drinks on one occassion? 0 Filed at: 07/14/2023 0112   Audit-C Score 0 Filed at: 07/14/2023 1117   MADELEINE: How many times in the past year have you. .. Used an illegal drug or used a prescription medication for non-medical reasons? Never Filed at: 07/14/2023 0112                    Medical Decision Making  The patient with Crohn's disease with nausea, vomiting. Believes she is passing a small mount of gas today. Also had a very tiny bowel movement.     For possible recurrent bowel obstruction, acute intra-abdominal process such as colitis, diverticulitis, appendicitis, pancreatitis. CT scan shows possible partial small bowel obstruction. Father and patient tell me that this has been read previously. I recommended admission to the hospital.  They declined. Will discharge with antiemetic. Recommend they follow-up with a gastroenterologist soon as possible. Come back at any point for reevaluation. Partial small bowel obstruction ([address]): acute illness or injury  Amount and/or Complexity of Data Reviewed  Labs: ordered. Radiology: ordered. Risk  Prescription drug management. Disposition  Final diagnoses:   Partial small bowel obstruction (HCC)   Hypokalemia     Time reflects when diagnosis was documented in both MDM as applicable and the Disposition within this note     Time User Action Codes Description Comment    7/14/2023  5:00 AM Blossom Campo [K56.600] Partial small bowel obstruction ([address])     7/14/2023  6:42 AM Blossom Campo [E87.6] Hypokalemia       ED Disposition     ED Disposition   AMA    Condition   --    Date/Time   Fri Jul 14, 2023  4:59 AM    Comment   Date: 7/14/2023  Patient: Saloni Barrios  Admitted: 7/14/2023  1:02 AM  Attending Provider: Gwen Peterson DO Saloni Barrios or her authorized caregiver has made the decision for the patient to leave the emergency department against  the advice of her attending physician. She or her authorized caregiver has been informed and understands the inherent risks, including death, loss of bowel. She or her authorized caregiver has decided to accept the responsibility for this decision. Teetee MARISSA Terry and all necessary parties have been advised that she may return for further evaluation or treatment. Her condition at time of discharge was good.   Meño Setwart had current vital signs as follows:  /60 (BP Location: Le ft arm)   Pulse 58   Temp 98.3 °F (36.8 °C) (Oral)   Resp 18   LMP 07/01/2023 (Exact Date)            Follow-up Information     Follow up With Specialties Details Why Contact Info Additional [address], MD Pediatrics  For re-evaluation as soon as possible [address]   [address]  [address] [address]  [facility] Emergency Department Emergency Medicine  If symptoms worsen [address] [phone] [address] [facility] Emergency Department, [address] [place] [address] Dr, [address]          Discharge Medication List as of 7/14/2023  5:00 AM      START taking these medications    Details   ondansetron (Zofran ODT) 4 mg disintegrating tablet Take 1 tablet (4 mg total) by mouth every 6 (six) hours as needed for nausea or vomiting, Starting Fri 7/14/2023, Normal         CONTINUE these medications which have NOT CHANGED    Details   inFLIXimab (REMICADE) 100 mg Inject into a catheter in a vein, Historical Med      levothyroxine 150 mcg tablet Take 150 mcg by mouth daily  , Starting Tue 1/16/2018, Historical Med      cyclobenzaprine (FLEXERIL) 10 mg tablet Take 1 tablet (10 mg total) by mouth 2 (two) times a day as needed for muscle spasms, Starting Thu 11/3/2022, Normal      meloxicam (MOBIC) 15 mg tablet Take 1 tablet (15 mg total) by mouth daily, Starting Fri 3/3/2023, Normal             No discharge procedures on file.     [address] Review     None          ED Provider  Electronically Signed by           Natalie Hernandez DO  07/14/23 7511

## 2023-11-15 ENCOUNTER — APPOINTMENT (OUTPATIENT)
Dept: LAB | Facility: CLINIC | Age: 23
End: 2023-11-15
Payer: COMMERCIAL

## 2023-11-15 DIAGNOSIS — E03.1 CONGENITAL HYPOTHYROIDISM: ICD-10-CM

## 2023-11-15 LAB
T4 FREE SERPL-MCNC: 1.37 NG/DL (ref 0.61–1.12)
TSH SERPL DL<=0.05 MIU/L-ACNC: 1.57 UIU/ML (ref 0.45–4.5)

## 2023-11-15 PROCEDURE — 84443 ASSAY THYROID STIM HORMONE: CPT

## 2023-11-15 PROCEDURE — 36415 COLL VENOUS BLD VENIPUNCTURE: CPT

## 2023-11-15 PROCEDURE — 84439 ASSAY OF FREE THYROXINE: CPT

## 2024-02-27 ENCOUNTER — OFFICE VISIT (OUTPATIENT)
Dept: URGENT CARE | Facility: CLINIC | Age: 24
End: 2024-02-27
Payer: COMMERCIAL

## 2024-02-27 VITALS
SYSTOLIC BLOOD PRESSURE: 121 MMHG | DIASTOLIC BLOOD PRESSURE: 68 MMHG | TEMPERATURE: 97.5 F | HEART RATE: 81 BPM | RESPIRATION RATE: 16 BRPM

## 2024-02-27 DIAGNOSIS — R05.1 ACUTE COUGH: Primary | ICD-10-CM

## 2024-02-27 PROCEDURE — 99214 OFFICE O/P EST MOD 30 MIN: CPT | Performed by: PHYSICIAN ASSISTANT

## 2024-02-27 RX ORDER — ALBUTEROL SULFATE 90 UG/1
2 AEROSOL, METERED RESPIRATORY (INHALATION) EVERY 6 HOURS PRN
Qty: 8.5 G | Refills: 0 | Status: SHIPPED | OUTPATIENT
Start: 2024-02-27

## 2024-02-27 RX ORDER — BENZONATATE 100 MG/1
100 CAPSULE ORAL 3 TIMES DAILY PRN
Qty: 20 CAPSULE | Refills: 0 | Status: SHIPPED | OUTPATIENT
Start: 2024-02-27

## 2024-02-28 NOTE — PROGRESS NOTES
[facility]        NAME: Teetee Stewart is a 23 y.o. female  : 2000    MRN: [id number]  DATE: 2024  TIME: 7:54 PM    Assessment and Plan   Acute cough [R05.1]  1. Acute cough  benzonatate (TESSALON PERLES) 100 mg capsule    albuterol (ProAir HFA) 90 mcg/act inhaler      Patient presents with viral URI with cough.  Recommend Tessalon Perles and albuterol inhaler.  We also discussed Flonase nasal bradycardia for postnasal drip which should help suppress the cough as well.      Patient Instructions     Patient Instructions   Take Tessalon Perles as prescribed as needed.  Use albuterol inhaler as directed as needed for mild shortness of breath and/or coughing fits.  Fluticasone nasal spray over-the-counter recommended use as directed.  If symptoms do not improve in 1 to 2 weeks follow-up with PCP.  If symptoms worsen or new symptoms develop report to the emergency room immediately.    Follow up with PCP in 3-5 days.  Proceed to  ER if symptoms worsen.    Chief Complaint     Chief Complaint   Patient presents with    Cough     Pt reports a cough x 1 week and she reports it will not go away. Was taking OTC cough meds per father and he stated that she was taking it and it did help but then it came back         History of Present Illness       23-year-old female presents with complaint of cough for 1 week duration.  Patient reports dry cough denies any fever, chills, breath, chest pain at this time.  Patient notes some mild congestion and runny nose with postnasal drip as well.  She has tried [place] with intermittent relief but cough returns just as severe as before taking the medication.  She states she had a similar illness in  and was prescribed an inhaler which helped significantly.    Cough  Associated symptoms include postnasal drip and rhinorrhea. Pertinent negatives include no chest pain, chills, fever, sore throat or shortness of breath.       Review of Systems   Review of  Systems   Constitutional:  Negative for chills, fatigue and fever.   HENT:  Positive for congestion, postnasal drip and rhinorrhea. Negative for sore throat.    Respiratory:  Positive for cough. Negative for shortness of breath.    Cardiovascular:  Negative for chest pain.         Current Medications       Current Outpatient Medications:     albuterol (ProAir HFA) 90 mcg/act inhaler, Inhale 2 puffs every 6 (six) hours as needed for wheezing, Disp: 8.5 g, Rfl: 0    benzonatate (TESSALON PERLES) 100 mg capsule, Take 1 capsule (100 mg total) by mouth 3 (three) times a day as needed for cough, Disp: 20 capsule, Rfl: 0    cyclobenzaprine (FLEXERIL) 10 mg tablet, Take 1 tablet (10 mg total) by mouth 2 (two) times a day as needed for muscle spasms (Patient not taking: Reported on 12/6/2022), Disp: 20 tablet, Rfl: 0    inFLIXimab (REMICADE) 100 mg, Inject into a catheter in a vein, Disp: , Rfl:     levothyroxine 150 mcg tablet, Take 150 mcg by mouth daily  , Disp: , Rfl:     meloxicam (MOBIC) 15 mg tablet, Take 1 tablet (15 mg total) by mouth daily, Disp: 30 tablet, Rfl: 0    ondansetron (Zofran ODT) 4 mg disintegrating tablet, Take 1 tablet (4 mg total) by mouth every 6 (six) hours as needed for nausea or vomiting, Disp: 20 tablet, Rfl: 0    Current Allergies     Allergies as of 02/27/2024    (No Known Allergies)            The following portions of the patient's history were reviewed and updated as appropriate: allergies, current medications, past family history, past medical history, past social history, past surgical history and problem list.     Past Medical History:   Diagnosis Date    Abdominal pain     Abnormal CAT scan     inflammation on terminal ileum    Crohn's disease (HCC)     Hypothyroid        Past Surgical History:   Procedure Laterality Date    [place] COLONOSCOPY FLX DX W/COLLJ SPEC WHEN PFRMD N/A 3/29/2018    Procedure: COLONOSCOPY;  Surgeon: David Aden MD;  Location: [facility];  Service: Gastroenterology        Family History   Problem Relation Age of Onset    Crohn's disease Paternal Aunt          Medications have been verified.        Objective   /68   Pulse 81   Temp 97.5 °F (36.4 °C)   Resp 16   No LMP recorded.       Physical Exam     Physical Exam  Vitals and nursing note reviewed.   Constitutional:       General: She is not in acute distress.     Appearance: Normal appearance. She is not ill-appearing or toxic-appearing.   HENT:      Head: Normocephalic and atraumatic.      Jaw: No trismus.      Right Ear: Hearing, tympanic membrane, ear canal and external ear normal. There is no impacted cerumen. No foreign body.      Left Ear: Hearing, tympanic membrane, ear canal and external ear normal. There is no impacted cerumen. No foreign body.      Nose: Mucosal edema, congestion and rhinorrhea present. No nasal deformity. Rhinorrhea is clear.      Right Nostril: No foreign body, epistaxis or occlusion.      Left Nostril: No foreign body, epistaxis or occlusion.      Right Turbinates: Not enlarged, swollen or pale.      Left Turbinates: Not enlarged, swollen or pale.      Mouth/Throat:      Lips: Pink. No lesions.      Mouth: Mucous membranes are moist. No injury, oral lesions or angioedema.      Dentition: Normal dentition.      Tongue: No lesions. Tongue does not deviate from midline.      Palate: No mass and lesions.      Pharynx: Uvula midline. No pharyngeal swelling, oropharyngeal exudate, posterior oropharyngeal erythema or uvula swelling.      Tonsils: No tonsillar exudate or tonsillar abscesses.   Eyes:      General: Lids are normal. Gaze aligned appropriately. No allergic shiner.     Extraocular Movements: Extraocular movements intact.   Cardiovascular:      Rate and Rhythm: Normal rate and regular rhythm.      Heart sounds: Normal heart sounds, S1 normal and S2 normal. Heart sounds not distant. No murmur heard.     No friction rub. No gallop.   Pulmonary:      Effort: Pulmonary effort is normal.     "  Breath sounds: Normal breath sounds. No decreased breath sounds, wheezing, rhonchi or rales.      Comments: Patient speaking in full sentences with no increased respiratory effort.   No audible wheezing or stridor.   Lymphadenopathy:      Cervical: No cervical adenopathy.   Skin:     General: Skin is warm and dry.   Neurological:      Mental Status: She is alert and oriented to person, place, and time.      Coordination: Coordination is intact.      Gait: Gait is intact.   Psychiatric:         Attention and Perception: Attention and perception normal.         Mood and Affect: Mood and affect normal.         Speech: Speech normal.         Behavior: Behavior is cooperative.               Note: Portions of this record may have been created with voice recognition software. Occasional wrong word or \"sound a like\" substitutions may have occurred due to the inherent limitations of voice recognition software. Please read the chart carefully and recognize, using context, where substitutions have occurred.*      "

## 2024-02-28 NOTE — PATIENT INSTRUCTIONS
Take Tessalon Perles as prescribed as needed.  Use albuterol inhaler as directed as needed for mild shortness of breath and/or coughing fits.  Fluticasone nasal spray over-the-counter recommended use as directed.  If symptoms do not improve in 1 to 2 weeks follow-up with PCP.  If symptoms worsen or new symptoms develop report to the emergency room immediately.

## 2024-03-22 ENCOUNTER — APPOINTMENT (OUTPATIENT)
Dept: LAB | Facility: CLINIC | Age: 24
End: 2024-03-22
Payer: COMMERCIAL

## 2024-03-22 DIAGNOSIS — Z79.899 ENCOUNTER FOR LONG-TERM (CURRENT) USE OF OTHER MEDICATIONS: ICD-10-CM

## 2024-03-22 DIAGNOSIS — K50.019 CROHN'S DISEASE OF SMALL INTESTINE WITH COMPLICATION (HCC): ICD-10-CM

## 2024-03-22 LAB
ALBUMIN SERPL BCP-MCNC: 4.1 G/DL (ref 3.5–5)
ALP SERPL-CCNC: 102 U/L (ref 34–104)
ALT SERPL W P-5'-P-CCNC: 14 U/L (ref 7–52)
ANION GAP SERPL CALCULATED.3IONS-SCNC: 7 MMOL/L (ref 4–13)
AST SERPL W P-5'-P-CCNC: 13 U/L (ref 13–39)
BASOPHILS # BLD AUTO: 0.03 THOUSANDS/ÂΜL (ref 0–0.1)
BASOPHILS NFR BLD AUTO: 0 % (ref 0–1)
BILIRUB SERPL-MCNC: 0.63 MG/DL (ref 0.2–1)
BUN SERPL-MCNC: 8 MG/DL (ref 5–25)
CALCIUM SERPL-MCNC: 9 MG/DL (ref 8.4–10.2)
CHLORIDE SERPL-SCNC: 105 MMOL/L (ref 96–108)
CO2 SERPL-SCNC: 23 MMOL/L (ref 21–32)
CREAT SERPL-MCNC: 0.41 MG/DL (ref 0.6–1.3)
CRP SERPL QL: 7.3 MG/L
EOSINOPHIL # BLD AUTO: 0.17 THOUSAND/ÂΜL (ref 0–0.61)
EOSINOPHIL NFR BLD AUTO: 2 % (ref 0–6)
ERYTHROCYTE [DISTWIDTH] IN BLOOD BY AUTOMATED COUNT: 13.2 % (ref 11.6–15.1)
ERYTHROCYTE [SEDIMENTATION RATE] IN BLOOD: 42 MM/HOUR (ref 0–19)
GFR SERPL CREATININE-BSD FRML MDRD: 146 ML/MIN/1.73SQ M
GLUCOSE SERPL-MCNC: 72 MG/DL (ref 65–140)
HCT VFR BLD AUTO: 39.8 % (ref 34.8–46.1)
HGB BLD-MCNC: 13.1 G/DL (ref 11.5–15.4)
IMM GRANULOCYTES # BLD AUTO: 0.03 THOUSAND/UL (ref 0–0.2)
IMM GRANULOCYTES NFR BLD AUTO: 0 % (ref 0–2)
LYMPHOCYTES # BLD AUTO: 2.65 THOUSANDS/ÂΜL (ref 0.6–4.47)
LYMPHOCYTES NFR BLD AUTO: 25 % (ref 14–44)
MCH RBC QN AUTO: 26.6 PG (ref 26.8–34.3)
MCHC RBC AUTO-ENTMCNC: 32.9 G/DL (ref 31.4–37.4)
MCV RBC AUTO: 81 FL (ref 82–98)
MONOCYTES # BLD AUTO: 0.74 THOUSAND/ÂΜL (ref 0.17–1.22)
MONOCYTES NFR BLD AUTO: 7 % (ref 4–12)
NEUTROPHILS # BLD AUTO: 6.93 THOUSANDS/ÂΜL (ref 1.85–7.62)
NEUTS SEG NFR BLD AUTO: 66 % (ref 43–75)
NRBC BLD AUTO-RTO: 0 /100 WBCS
PLATELET # BLD AUTO: 232 THOUSANDS/UL (ref 149–390)
PMV BLD AUTO: 9.7 FL (ref 8.9–12.7)
POTASSIUM SERPL-SCNC: 3.5 MMOL/L (ref 3.5–5.3)
PROT SERPL-MCNC: 7.6 G/DL (ref 6.4–8.4)
RBC # BLD AUTO: 4.93 MILLION/UL (ref 3.81–5.12)
SODIUM SERPL-SCNC: 135 MMOL/L (ref 135–147)
WBC # BLD AUTO: 10.55 THOUSAND/UL (ref 4.31–10.16)

## 2024-03-22 PROCEDURE — 36415 COLL VENOUS BLD VENIPUNCTURE: CPT

## 2024-03-22 PROCEDURE — 85025 COMPLETE CBC W/AUTO DIFF WBC: CPT

## 2024-03-22 PROCEDURE — 80053 COMPREHEN METABOLIC PANEL: CPT

## 2024-03-22 PROCEDURE — 86140 C-REACTIVE PROTEIN: CPT

## 2024-03-22 PROCEDURE — 85652 RBC SED RATE AUTOMATED: CPT

## 2024-07-19 ENCOUNTER — APPOINTMENT (OUTPATIENT)
Dept: LAB | Facility: CLINIC | Age: 24
End: 2024-07-19
Payer: COMMERCIAL

## 2024-07-19 ENCOUNTER — OFFICE VISIT (OUTPATIENT)
Age: 24
End: 2024-07-19
Payer: COMMERCIAL

## 2024-07-19 VITALS
HEIGHT: 63 IN | TEMPERATURE: 98.4 F | RESPIRATION RATE: 18 BRPM | BODY MASS INDEX: 28.17 KG/M2 | DIASTOLIC BLOOD PRESSURE: 70 MMHG | WEIGHT: 159 LBS | HEART RATE: 91 BPM | OXYGEN SATURATION: 97 % | SYSTOLIC BLOOD PRESSURE: 122 MMHG

## 2024-07-19 DIAGNOSIS — Z11.1 SCREENING FOR TUBERCULOSIS: ICD-10-CM

## 2024-07-19 DIAGNOSIS — Z00.00 HEALTHCARE MAINTENANCE: ICD-10-CM

## 2024-07-19 DIAGNOSIS — Z11.4 SCREENING FOR HIV (HUMAN IMMUNODEFICIENCY VIRUS): ICD-10-CM

## 2024-07-19 DIAGNOSIS — Z00.00 ANNUAL PHYSICAL EXAM: Primary | ICD-10-CM

## 2024-07-19 DIAGNOSIS — Z13.6 SCREENING FOR CARDIOVASCULAR CONDITION: ICD-10-CM

## 2024-07-19 PROCEDURE — 36415 COLL VENOUS BLD VENIPUNCTURE: CPT

## 2024-07-19 PROCEDURE — 86480 TB TEST CELL IMMUN MEASURE: CPT

## 2024-07-19 PROCEDURE — 99385 PREV VISIT NEW AGE 18-39: CPT

## 2024-07-19 NOTE — PROGRESS NOTES
Adult Annual Physical  Name: Teetee Stewart      : 2000      MRN: [id number]  Encounter Provider: Chrissy Barrett MD  Encounter Date: 2024   Encounter department: [facility]    Assessment & Plan   Ms. Stewart was seen today to establish care. Routine bloodwork was ordered. Getting a pap smear was discussed and patient will call back if she is interested in having one done, seems hesitant at this point in time. Discussed risks and benefits. Immunizations pending from pediatrician.      1. Annual physical exam  2. Healthcare maintenance  Assessment & Plan:  [facility]  Immunizations: Tdap need records, flu NIS, covid x2, zoster @50, pneumococcal @65  Colon Cancer: Cologuard/Colonoscopy due @50yr  Breast cancer: Mammo due @40  Cervical Cancer: Pap due  Lung Cancer: not indicated  Osteoporosis: DXA due @65  Labs: 3/2024 CBC/CMP wnl mild leukocytosis, HIV ordered, HCV neg    3. Screening for HIV (human immunodeficiency virus)  -     HIV 1/2 AG/AB w Reflex [facility] for 2 yr old and above; Future  4. Screening for tuberculosis  -     Quantiferon TB Gold Plus Assay; Future  5. Screening for cardiovascular condition  -     Lipid panel; Future    Immunizations and preventive care screenings were discussed with patient today. Appropriate education was printed on patient's after visit summary.    Counseling:  Alcohol/drug use: discussed moderation in alcohol intake, the recommendations for healthy alcohol use, and avoidance of illicit drug use.  Dental Health: discussed importance of regular tooth brushing, flossing, and dental visits.  Injury prevention: discussed safety/seat belts, safety helmets, smoke detectors, carbon dioxide detectors, and smoking near bedding or upholstery.  Sexual health: discussed sexually transmitted diseases, partner selection, use of condoms, avoidance of unintended pregnancy, and contraceptive alternatives.  Exercise: the importance of regular exercise/physical  activity was discussed. Recommend exercise 3-5 times per week for at least 30 minutes.      History of Present Illness     Adult Annual Physical:  Patient presents for annual physical. Ms. 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 She is being followed by an endocrinology for her thyroid for which she has been on Synthroid since childhood. She is also followed by a GI specialist for her Crohn's for which she was diagnosed in 2018 and has been been taking Remicade. She has no acute concerns at this time. Needs a Quantiferon test ordered for school.    Allergies:  NKDA  NKFA   Seasonal allergies (coughing, sneezing)    Caffeine:  Drinks 3-4 cups of coffee and alternates with Celcius (energy drink) per week     Immunizations:  Requesting from pediatrician, do not have any on record currently     Alcohol/Tobacco/Drugs:  Denies current and past use of all.    PMH:  Childhood: Congential hypothyroidism, Crohn's (2018)  Adult: None  Gyn: Menarche at 13, LMP 7/14/2024  Mental Health: None     Family History:  Mom, 46, healthy  Dad, 46, healthy  Sister, 22, healthy  Brother, 18, healthy    Social History:  Occupation: Student (currently in [employer], set to graduate in May 2025)    Sexual History:  Denies current or past sexual activity   .     Diet and Physical Activity:  - Diet/Nutrition: well balanced diet and consuming 3-5 servings of fruits/vegetables daily. 2 meals/day, usually skips breakfast  - Exercise: walking. 3x/week    Depression Screening:  - PHQ-2 Score: 0    General Health:  - Sleep: 4-6 hours of sleep on average and sleeps well.  - Hearing: normal hearing bilateral ears.  - Vision: wears glasses and most recent eye exam > 1 year ago.  - Dental: regular dental visits and brushes teeth once daily.    /GYN Health:  - Follows with GYN: no.   - Last menstrual cycle: 7/14/2024.  "  - History of STDs: yes    Review of Systems   Constitutional:  Negative for chills and fever.   HENT:  Positive for sneezing. Negative for ear discharge, ear pain, nosebleeds, rhinorrhea, sinus pressure, sinus pain and sore throat.    Eyes:  Negative for pain and visual disturbance.   Respiratory:  Negative for cough, chest tightness and shortness of breath.    Cardiovascular:  Negative for chest pain and palpitations.   Gastrointestinal:  Negative for abdominal pain and vomiting.   Genitourinary:  Negative for dysuria and hematuria.   Musculoskeletal:  Positive for arthralgias (Chronic in ankles and wrists) and back pain (Chronic low back pain).   Skin:  Negative for color change and rash.   Allergic/Immunologic: Positive for environmental allergies.   Neurological:  Negative for seizures and syncope.   Psychiatric/Behavioral:  Negative for sleep disturbance.    All other systems reviewed and are negative.    Objective     /70   Pulse 91   Temp 98.4 °F (36.9 °C)   Resp 18   Ht 5' 3\" (1.6 m)   Wt 72.1 kg (159 lb)   SpO2 97%   BMI 28.17 kg/m²     Physical Exam  Vitals and nursing note reviewed.   Constitutional:       General: She is not in acute distress.     Appearance: Normal appearance. She is well-developed.   HENT:      Head: Normocephalic and atraumatic.      Right Ear: Tympanic membrane, ear canal and external ear normal.      Left Ear: Tympanic membrane, ear canal and external ear normal.      Nose: Nose normal.      Mouth/Throat:      Mouth: Mucous membranes are moist.      Pharynx: Oropharynx is clear.   Eyes:      Conjunctiva/sclera: Conjunctivae normal.   Neck:      Comments: Patient born without a thyroid   Cardiovascular:      Rate and Rhythm: Normal rate and regular rhythm.      Heart sounds: No murmur heard.  Pulmonary:      Effort: Pulmonary effort is normal. No respiratory distress.      Breath sounds: Normal breath sounds.   Abdominal:      Palpations: Abdomen is soft.      " Tenderness: There is no abdominal tenderness.   Musculoskeletal:         General: No swelling.      Cervical back: Neck supple. No tenderness.   Skin:     General: Skin is warm and dry.      Capillary Refill: Capillary refill takes less than 2 seconds.   Neurological:      Mental Status: She is alert.   Psychiatric:         Mood and Affect: Mood normal.         Behavior: Behavior normal.         Thought Content: Thought content normal.         Judgment: Judgment normal.       Chrissy Barrett MD  7/19/2024, 3:32 PM  PGY-2 [facility] [place]

## 2024-07-19 NOTE — ASSESSMENT & PLAN NOTE
HCM  Immunizations: Tdap need records, flu NIS, covid x2, zoster @50, pneumococcal @65  Colon Cancer: Cologuard/Colonoscopy due @50yr  Breast cancer: Mammo due @40  Cervical Cancer: Pap due  Lung Cancer: not indicated  Osteoporosis: DXA due @65  Labs: 3/2024 CBC/CMP wnl mild leukocytosis, HIV ordered, HCV neg

## 2024-07-19 NOTE — PATIENT INSTRUCTIONS
"Consider scheduling for pap smear  Patient Education     Routine physical for adults   The Basics   Written by the doctors and editors at [facility]   What is a physical? -- A physical is a routine visit, or \"check-up,\" with your doctor. You might also hear it called a \"wellness visit\" or \"preventive visit.\"  During each visit, the doctor will:   Ask about your physical and mental health   Ask about your habits, behaviors, and lifestyle   Do an exam   Give you vaccines if needed   Talk to you about any medicines you take   Give advice about your health   Answer your questions  Getting regular check-ups is an important part of taking care of your health. It can help your doctor find and treat any problems you have. But it's also important for preventing health problems.  A routine physical is different from a \"sick visit.\" A sick visit is when you see a doctor because of a health concern or problem. Since physicals are scheduled ahead of time, you can think about what you want to ask the doctor.  How often should I get a physical? -- It depends on your age and health. In general, for people age 21 years and older:   If you are younger than 50 years, you might be able to get a physical every 3 years.   If you are 50 years or older, your doctor might recommend a physical every year.  If you have an ongoing health condition, like diabetes or high blood pressure, your doctor will probably want to see you more often.  What happens during a physical? -- In general, each visit will include:   Physical exam - The doctor or nurse will check your height, weight, heart rate, and blood pressure. They will also look at your eyes and ears. They will ask about how you are feeling and whether you have any symptoms that bother you.   Medicines - It's a good idea to bring a list of all the medicines you take to each doctor visit. Your doctor will talk to you about your medicines and answer any questions. Tell them if you are having any " "side effects that bother you. You should also tell them if you are having trouble paying for any of your medicines.   Habits and behaviors - This includes:   Your diet   Your exercise habits   Whether you smoke, drink alcohol, or use drugs   Whether you are sexually active   Whether you feel safe at home  Your doctor will talk to you about things you can do to improve your health and lower your risk of health problems. They will also offer help and support. For example, if you want to quit smoking, they can give you advice and might prescribe medicines. If you want to improve your diet or get more physical activity, they can help you with this, too.   Lab tests, if needed - The tests you get will depend on your age and situation. For example, your doctor might want to check your:   Cholesterol   Blood sugar   Iron level   Vaccines - The recommended vaccines will depend on your age, health, and what vaccines you already had. Vaccines are very important because they can prevent certain serious or deadly infections.   Discussion of screening - \"Screening\" means checking for diseases or other health problems before they cause symptoms. Your doctor can recommend screening based on your age, risk, and preferences. This might include tests to check for:   Cancer, such as breast, prostate, cervical, ovarian, colorectal, prostate, lung, or skin cancer   Sexually transmitted infections, such as chlamydia and gonorrhea   Mental health conditions like depression and anxiety  Your doctor will talk to you about the different types of screening tests. They can help you decide which screenings to have. They can also explain what the results might mean.   Answering questions - The physical is a good time to ask the doctor or nurse questions about your health. If needed, they can refer you to other doctors or specialists, too.  Adults older than 65 years often need other care, too. As you get older, your doctor will talk to you " about:   How to prevent falling at home   Hearing or vision tests   Memory testing   How to take your medicines safely   Making sure that you have the help and support you need at home  All topics are updated as new evidence becomes available and our peer review process is complete.  This topic retrieved from [employer] on: May 02, 2024.  Topic 571178 Version 1.0  Release: 32.4.3 - C32.122  © 2024 UpToDate, Inc. and/or its affiliates. All rights reserved.  Consumer Information Use and Disclaimer   Disclaimer: This generalized information is a limited summary of diagnosis, treatment, and/or medication information. It is not meant to be comprehensive and should be used as a tool to help the user understand and/or assess potential diagnostic and treatment options. It does NOT include all information about conditions, treatments, medications, side effects, or risks that may apply to a specific patient. It is not intended to be medical advice or a substitute for the medical advice, diagnosis, or treatment of a health care provider based on the health care provider's examination and assessment of a patient's specific and unique circumstances. Patients must speak with a health care provider for complete information about their health, medical questions, and treatment options, including any risks or benefits regarding use of medications. This information does not endorse any treatments or medications as safe, effective, or approved for treating a specific patient. UpToDate, Inc. and its affiliates disclaim any warranty or liability relating to this information or the use thereof.The use of this information is governed by the Terms of Use, available at https://www.wolterskluwer.com/en/know/clinical-effectiveness-terms. 2024© UpToDate, Inc. and its affiliates and/or licensors. All rights reserved.  Copyright   © 2024 UpToDate, Inc. and/or its affiliates. All rights reserved.

## 2024-07-20 LAB
GAMMA INTERFERON BACKGROUND BLD IA-ACNC: 0.06 IU/ML
M TB IFN-G BLD-IMP: NEGATIVE
M TB IFN-G CD4+ BCKGRND COR BLD-ACNC: -0.01 IU/ML
M TB IFN-G CD4+ BCKGRND COR BLD-ACNC: 0 IU/ML
MITOGEN IGNF BCKGRD COR BLD-ACNC: 9.94 IU/ML

## 2024-07-22 ENCOUNTER — TELEPHONE (OUTPATIENT)
Age: 24
End: 2024-07-22

## 2024-07-22 NOTE — TELEPHONE ENCOUNTER
Lvm for pt with results    Chrissy Barrett MD  [facility]  Please let Teetee know her quantiferon testing (TB screening) was normal. Will follow up results of other lab tests when they are drawn.    Chrissy Barrett MD  7/22/2024, 1:07 PM  PGY-2 [facility]

## 2024-07-22 NOTE — RESULT ENCOUNTER NOTE
Please let Teetee know her quantiferon testing (TB screening) was normal. Will follow up results of other lab tests when they are drawn.     Chrissy Barrett MD  7/22/2024, 1:07 PM  PGY-2 [facility]

## 2024-08-18 PROBLEM — Z00.00 HEALTHCARE MAINTENANCE: Status: RESOLVED | Noted: 2024-07-19 | Resolved: 2024-08-18

## 2024-09-13 ENCOUNTER — HOSPITAL ENCOUNTER (OUTPATIENT)
Dept: MRI IMAGING | Facility: HOSPITAL | Age: 24
Discharge: HOME | End: 2024-09-13
Payer: COMMERCIAL

## 2024-09-13 DIAGNOSIS — K50.819 CROHN'S DISEASE OF BOTH SMALL AND LARGE INTESTINE WITH UNSPECIFIED COMPLICATIONS (HCC): ICD-10-CM

## 2024-09-13 PROCEDURE — 74183 MRI ABD W/O CNTR FLWD CNTR: CPT

## 2024-09-13 PROCEDURE — 72197 MRI PELVIS W/O & W/DYE: CPT

## 2024-09-13 PROCEDURE — A9585 GADOBUTROL INJECTION: HCPCS

## 2024-09-13 RX ORDER — GADOBUTROL 604.72 MG/ML
7 INJECTION INTRAVENOUS
Status: COMPLETED | OUTPATIENT
Start: 2024-09-13 | End: 2024-09-13

## 2024-09-13 RX ADMIN — GADOBUTROL 7 ML: 604.72 INJECTION INTRAVENOUS at 09:24

## 2024-09-13 RX ADMIN — GLUCAGON 1 MG: KIT at 09:13

## 2025-01-13 ENCOUNTER — VBI (OUTPATIENT)
Dept: ADMINISTRATIVE | Facility: OTHER | Age: 25
End: 2025-01-13

## 2025-01-13 NOTE — TELEPHONE ENCOUNTER
01/13/25 9:32 AM     Chart reviewed for Pap Smear (HPV) aka Cervical Cancer Screening ; nothing is submitted to the patient's insurance at this time.     Mana Hicsk MA   [facility]

## 2025-04-12 ENCOUNTER — APPOINTMENT (OUTPATIENT)
Dept: LAB | Age: 25
End: 2025-04-12
Payer: COMMERCIAL

## 2025-04-12 DIAGNOSIS — E03.1 CONGENITAL HYPOTHYROIDISM: ICD-10-CM

## 2025-04-12 LAB
T4 FREE SERPL-MCNC: 1.31 NG/DL (ref 0.61–1.12)
TSH SERPL DL<=0.05 MIU/L-ACNC: 0.62 UIU/ML (ref 0.45–4.5)

## 2025-04-12 PROCEDURE — 36415 COLL VENOUS BLD VENIPUNCTURE: CPT

## 2025-04-12 PROCEDURE — 84443 ASSAY THYROID STIM HORMONE: CPT

## 2025-04-12 PROCEDURE — 84439 ASSAY OF FREE THYROXINE: CPT

## 2025-05-16 ENCOUNTER — VBI (OUTPATIENT)
Dept: ADMINISTRATIVE | Facility: OTHER | Age: 25
End: 2025-05-16

## 2025-05-16 NOTE — TELEPHONE ENCOUNTER
05/16/25 7:52 AM     Chart reviewed for Pap Smear (HPV) aka Cervical Cancer Screening ; nothing is submitted to the patient's insurance at this time.     Mana Hicks MA   [facility]